# Patient Record
Sex: FEMALE | Race: OTHER | HISPANIC OR LATINO | Employment: FULL TIME | URBAN - METROPOLITAN AREA
[De-identification: names, ages, dates, MRNs, and addresses within clinical notes are randomized per-mention and may not be internally consistent; named-entity substitution may affect disease eponyms.]

---

## 2023-08-29 NOTE — PROGRESS NOTES
"Subjective   Chief Complaint   Patient presents with    New Patient Visit     PAOLA IS HERE TODAY FOR  NPV TO ESTABLISH CARE, PREVIOUSLY SEEN BY TANK LAZO. PT STATES THAT SHE WAS SEEN EVERY 6 MONTHS FOR ROUTINE F/U AND BLOOD WORK.   PT STATES THAT SHE IS DUE FOR BLOOD WORK FOR IRON AND VITAMIN D.     PT ALSO A CONCERN AND WOULD LIKE TO DISCUSS WITH VARICOSE VEINS.      PT IS SCHEDULED TO HAVE SURGERY SOON ON HER JAW       Patient ID: Paola Black is a 28 y.o. female who presents for New Patient Visit (PAOLA IS HERE TODAY FOR  NPV TO ESTABLISH CARE, PREVIOUSLY SEEN BY TANK LAZO. PT STATES THAT SHE WAS SEEN EVERY 6 MONTHS FOR ROUTINE F/U AND BLOOD WORK. /PT STATES THAT SHE IS DUE FOR BLOOD WORK FOR IRON AND VITAMIN D. //PT ALSO A CONCERN AND WOULD LIKE TO DISCUSS WITH VARICOSE VEINS.  //PT IS SCHEDULED TO HAVE SURGERY SOON ON HER JAW).    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     LMP: 5/2023 (DEPO)  PAP: 2022    Pt has been taking Vitamin D 50,000 units daily and Iron 65 mg daily      Review of Systems  All 13 systems were reviewed and are within normal limits except positive and pertinent negative responses which are noted below or in HPI.      Objective   /76   Pulse 80   Ht 1.676 m (5' 6\")   Wt 120 kg (264 lb)   BMI 42.61 kg/m²        Physical Exam    Assessment/Plan       "

## 2023-08-30 ENCOUNTER — LAB (OUTPATIENT)
Dept: LAB | Facility: LAB | Age: 28
End: 2023-08-30
Payer: COMMERCIAL

## 2023-08-30 ENCOUNTER — OFFICE VISIT (OUTPATIENT)
Dept: PRIMARY CARE | Facility: CLINIC | Age: 28
End: 2023-08-30
Payer: COMMERCIAL

## 2023-08-30 VITALS
HEART RATE: 80 BPM | BODY MASS INDEX: 42.43 KG/M2 | WEIGHT: 264 LBS | SYSTOLIC BLOOD PRESSURE: 116 MMHG | DIASTOLIC BLOOD PRESSURE: 76 MMHG | HEIGHT: 66 IN

## 2023-08-30 DIAGNOSIS — K21.9 GASTROESOPHAGEAL REFLUX DISEASE WITHOUT ESOPHAGITIS: ICD-10-CM

## 2023-08-30 DIAGNOSIS — R74.8 ELEVATED LIVER ENZYMES: ICD-10-CM

## 2023-08-30 DIAGNOSIS — Z91.030 BEE STING ALLERGY: ICD-10-CM

## 2023-08-30 DIAGNOSIS — E55.9 VITAMIN D DEFICIENCY: ICD-10-CM

## 2023-08-30 DIAGNOSIS — D50.8 IRON DEFICIENCY ANEMIA SECONDARY TO INADEQUATE DIETARY IRON INTAKE: Primary | ICD-10-CM

## 2023-08-30 DIAGNOSIS — I83.813 VARICOSE VEINS OF BOTH LOWER EXTREMITIES WITH PAIN: ICD-10-CM

## 2023-08-30 DIAGNOSIS — D50.8 IRON DEFICIENCY ANEMIA SECONDARY TO INADEQUATE DIETARY IRON INTAKE: ICD-10-CM

## 2023-08-30 PROBLEM — K58.1 IRRITABLE BOWEL SYNDROME WITH CONSTIPATION: Status: ACTIVE | Noted: 2023-08-30

## 2023-08-30 PROBLEM — R91.1 NODULE OF RIGHT LUNG: Status: ACTIVE | Noted: 2023-08-30

## 2023-08-30 LAB
ALANINE AMINOTRANSFERASE (SGPT) (U/L) IN SER/PLAS: 49 U/L (ref 7–45)
ALBUMIN (G/DL) IN SER/PLAS: 4.1 G/DL (ref 3.4–5)
ALKALINE PHOSPHATASE (U/L) IN SER/PLAS: 101 U/L (ref 33–110)
ANION GAP IN SER/PLAS: 13 MMOL/L (ref 10–20)
ASPARTATE AMINOTRANSFERASE (SGOT) (U/L) IN SER/PLAS: 35 U/L (ref 9–39)
BILIRUBIN TOTAL (MG/DL) IN SER/PLAS: 0.9 MG/DL (ref 0–1.2)
CALCIUM (MG/DL) IN SER/PLAS: 8.9 MG/DL (ref 8.6–10.3)
CARBON DIOXIDE, TOTAL (MMOL/L) IN SER/PLAS: 25 MMOL/L (ref 21–32)
CHLORIDE (MMOL/L) IN SER/PLAS: 106 MMOL/L (ref 98–107)
CREATININE (MG/DL) IN SER/PLAS: 0.66 MG/DL (ref 0.5–1.05)
ERYTHROCYTE DISTRIBUTION WIDTH (RATIO) BY AUTOMATED COUNT: 14.6 % (ref 11.5–14.5)
ERYTHROCYTE MEAN CORPUSCULAR HEMOGLOBIN CONCENTRATION (G/DL) BY AUTOMATED: 31.9 G/DL (ref 32–36)
ERYTHROCYTE MEAN CORPUSCULAR VOLUME (FL) BY AUTOMATED COUNT: 89 FL (ref 80–100)
ERYTHROCYTES (10*6/UL) IN BLOOD BY AUTOMATED COUNT: 4.31 X10E12/L (ref 4–5.2)
FERRITIN (UG/LL) IN SER/PLAS: 48 UG/L (ref 8–150)
GFR FEMALE: >90 ML/MIN/1.73M2
GLUCOSE (MG/DL) IN SER/PLAS: 80 MG/DL (ref 74–99)
HEMATOCRIT (%) IN BLOOD BY AUTOMATED COUNT: 38.2 % (ref 36–46)
HEMOGLOBIN (G/DL) IN BLOOD: 12.2 G/DL (ref 12–16)
IRON (UG/DL) IN SER/PLAS: 48 UG/DL (ref 35–150)
IRON BINDING CAPACITY (UG/DL) IN SER/PLAS: 355 UG/DL (ref 240–445)
IRON SATURATION (%) IN SER/PLAS: 14 % (ref 25–45)
LEUKOCYTES (10*3/UL) IN BLOOD BY AUTOMATED COUNT: 7.5 X10E9/L (ref 4.4–11.3)
PLATELETS (10*3/UL) IN BLOOD AUTOMATED COUNT: 362 X10E9/L (ref 150–450)
POTASSIUM (MMOL/L) IN SER/PLAS: 4.2 MMOL/L (ref 3.5–5.3)
PROTEIN TOTAL: 6.9 G/DL (ref 6.4–8.2)
SODIUM (MMOL/L) IN SER/PLAS: 140 MMOL/L (ref 136–145)
UREA NITROGEN (MG/DL) IN SER/PLAS: 10 MG/DL (ref 6–23)

## 2023-08-30 PROCEDURE — 82306 VITAMIN D 25 HYDROXY: CPT

## 2023-08-30 PROCEDURE — 80053 COMPREHEN METABOLIC PANEL: CPT

## 2023-08-30 PROCEDURE — 82728 ASSAY OF FERRITIN: CPT

## 2023-08-30 PROCEDURE — 1036F TOBACCO NON-USER: CPT | Performed by: NURSE PRACTITIONER

## 2023-08-30 PROCEDURE — 36415 COLL VENOUS BLD VENIPUNCTURE: CPT

## 2023-08-30 PROCEDURE — 85027 COMPLETE CBC AUTOMATED: CPT

## 2023-08-30 PROCEDURE — 83540 ASSAY OF IRON: CPT

## 2023-08-30 PROCEDURE — 99214 OFFICE O/P EST MOD 30 MIN: CPT | Performed by: NURSE PRACTITIONER

## 2023-08-30 PROCEDURE — 3008F BODY MASS INDEX DOCD: CPT | Performed by: NURSE PRACTITIONER

## 2023-08-30 PROCEDURE — 83550 IRON BINDING TEST: CPT

## 2023-08-30 RX ORDER — MEDROXYPROGESTERONE ACETATE 150 MG/ML
INJECTION, SUSPENSION INTRAMUSCULAR
COMMUNITY
Start: 2023-07-21

## 2023-08-30 RX ORDER — EPINEPHRINE 0.3 MG/.3ML
INJECTION SUBCUTANEOUS
Qty: 0.3 ML | Refills: 0 | Status: SHIPPED | OUTPATIENT
Start: 2023-08-30

## 2023-08-30 RX ORDER — AMITRIPTYLINE HYDROCHLORIDE 10 MG/1
10 TABLET, FILM COATED ORAL NIGHTLY
COMMUNITY

## 2023-08-30 RX ORDER — EPINEPHRINE 0.3 MG/.3ML
INJECTION SUBCUTANEOUS
COMMUNITY
Start: 2020-02-25 | End: 2023-08-30 | Stop reason: SDUPTHER

## 2023-08-30 RX ORDER — OMEPRAZOLE 20 MG/1
20 TABLET, DELAYED RELEASE ORAL DAILY
Qty: 30 TABLET | Refills: 11 | Status: SHIPPED | COMMUNITY
Start: 2023-08-30 | End: 2024-02-15 | Stop reason: SDUPTHER

## 2023-08-30 RX ORDER — ERGOCALCIFEROL 1.25 MG/1
1 CAPSULE ORAL
COMMUNITY

## 2023-08-30 RX ORDER — LINACLOTIDE 72 UG/1
72 CAPSULE, GELATIN COATED ORAL DAILY
COMMUNITY
End: 2024-01-26

## 2023-08-30 RX ORDER — FERROUS SULFATE 325(65) MG
1 TABLET ORAL DAILY
COMMUNITY
End: 2024-05-13

## 2023-08-30 NOTE — PATIENT INSTRUCTIONS
Thank you for seeing me today.  It was a pleasure to meet you!    #BEE STING ALLERGY  Refill on Epi pen    #Hx ELEVATED LIVER ENZYMES  Labs ordered    #VIT D DEF  Labs ordered    #HERI  Labs ordered    RTC ANNUALLY AND AS NEEDED

## 2023-08-30 NOTE — PROGRESS NOTES
"Subjective   Chief Complaint   Patient presents with    New Patient Visit     PAOLA IS HERE TODAY FOR  NPV TO ESTABLISH CARE, PREVIOUSLY SEEN BY TANK LAZO. PT STATES THAT SHE WAS SEEN EVERY 6 MONTHS FOR ROUTINE F/U AND BLOOD WORK.   PT STATES THAT SHE IS DUE FOR BLOOD WORK FOR IRON AND VITAMIN D.     PT ALSO A CONCERN AND WOULD LIKE TO DISCUSS WITH VARICOSE VEINS.      PT IS SCHEDULED TO HAVE SURGERY SOON ON HER JAW       Patient ID: Paola Black is a 28 y.o. female who presents for New Patient Visit (PAOLA IS HERE TODAY FOR  NPV TO ESTABLISH CARE, PREVIOUSLY SEEN BY TANK LAZO. PT STATES THAT SHE WAS SEEN EVERY 6 MONTHS FOR ROUTINE F/U AND BLOOD WORK. /PT STATES THAT SHE IS DUE FOR BLOOD WORK FOR IRON AND VITAMIN D. //PT ALSO A CONCERN AND WOULD LIKE TO DISCUSS WITH VARICOSE VEINS.  //PT IS SCHEDULED TO HAVE SURGERY SOON ON HER JAW).    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     LMP: 5/2023 (DEPO)  PAP: 2022    Review of Systems  All 13 systems were reviewed and are within normal limits except positive and pertinent negative responses which are noted below or in HPI.      Objective   /76   Pulse 80   Ht 1.676 m (5' 6\")   Wt 120 kg (264 lb)   BMI 42.61 kg/m²        Physical Exam  Vitals reviewed.   Cardiovascular:      Pulses: Normal pulses.   Pulmonary:      Effort: Pulmonary effort is normal.   Neurological:      Mental Status: She is alert.   Psychiatric:         Mood and Affect: Mood normal. "         Assessment/Plan   Problem List Items Addressed This Visit       Vitamin D deficiency    Relevant Orders    Vitamin D 25-Hydroxy,Total (for eval of Vitamin D levels)     Other Visit Diagnoses       Iron deficiency anemia secondary to inadequate dietary iron intake    -  Primary    Relevant Orders    CBC    Iron and TIBC    Ferritin    Bee sting allergy        Relevant Medications    EPINEPHrine 0.3 mg/0.3 mL injection syringe    Elevated liver enzymes        Relevant Orders    Comprehensive Metabolic Panel    Gastroesophageal reflux disease without esophagitis        Relevant Medications    omeprazole OTC (PriLOSEC OTC) 20 mg EC tablet    Varicose veins of both lower extremities with pain        Relevant Orders    Referral to Vascular Medicine

## 2023-08-31 LAB — CALCIDIOL (25 OH VITAMIN D3) (NG/ML) IN SER/PLAS: 38 NG/ML

## 2023-12-15 ENCOUNTER — PREP FOR PROCEDURE (OUTPATIENT)
Dept: OPERATING ROOM | Facility: HOSPITAL | Age: 28
End: 2023-12-15
Payer: COMMERCIAL

## 2023-12-15 DIAGNOSIS — M26.69: Primary | ICD-10-CM

## 2023-12-18 ENCOUNTER — HOSPITAL ENCOUNTER (OUTPATIENT)
Facility: HOSPITAL | Age: 28
Setting detail: SURGERY ADMIT
End: 2023-12-18
Attending: DENTIST | Admitting: DENTIST
Payer: COMMERCIAL

## 2023-12-18 PROBLEM — M26.69 DERANGEMENT OF TEMPOROMANDIBULAR JOINT: Status: ACTIVE | Noted: 2023-12-15

## 2024-01-25 DIAGNOSIS — K59.04 CHRONIC IDIOPATHIC CONSTIPATION: ICD-10-CM

## 2024-01-26 NOTE — TELEPHONE ENCOUNTER
Requested Prescriptions     Pending Prescriptions Disp Refills    Linzess 72 mcg capsule [Pharmacy Med Name: LINZESS 72 MCG CAPSULE] 30 capsule 2     Sig: TAKE 1 CAPSULE BY MOUTH EVERY DAY

## 2024-02-06 ENCOUNTER — HOSPITAL ENCOUNTER (OUTPATIENT)
Dept: RADIOLOGY | Facility: HOSPITAL | Age: 29
End: 2024-02-06
Payer: COMMERCIAL

## 2024-02-08 ENCOUNTER — HOSPITAL ENCOUNTER (OUTPATIENT)
Dept: RADIOLOGY | Facility: HOSPITAL | Age: 29
Discharge: HOME | End: 2024-02-08
Payer: COMMERCIAL

## 2024-02-08 DIAGNOSIS — N91.2 AMENORRHEA: ICD-10-CM

## 2024-02-08 PROCEDURE — 76830 TRANSVAGINAL US NON-OB: CPT | Performed by: RADIOLOGY

## 2024-02-08 PROCEDURE — 76856 US EXAM PELVIC COMPLETE: CPT

## 2024-02-08 PROCEDURE — 76856 US EXAM PELVIC COMPLETE: CPT | Performed by: RADIOLOGY

## 2024-02-15 DIAGNOSIS — K21.9 GASTROESOPHAGEAL REFLUX DISEASE WITHOUT ESOPHAGITIS: ICD-10-CM

## 2024-02-15 RX ORDER — OMEPRAZOLE 20 MG/1
20 TABLET, DELAYED RELEASE ORAL DAILY
Qty: 90 TABLET | Refills: 3 | Status: SHIPPED | OUTPATIENT
Start: 2024-02-15 | End: 2024-03-18

## 2024-02-16 ENCOUNTER — LAB (OUTPATIENT)
Dept: LAB | Facility: LAB | Age: 29
End: 2024-02-16
Payer: COMMERCIAL

## 2024-02-16 DIAGNOSIS — N91.2 AMENORRHEA, UNSPECIFIED: Primary | ICD-10-CM

## 2024-02-16 LAB
DHEA-S SERPL-MCNC: 54 UG/DL (ref 65–395)
ESTRADIOL SERPL-MCNC: 27 PG/ML
FSH SERPL-ACNC: 5.8 IU/L
LH SERPL-ACNC: 3.7 IU/L
PROGEST SERPL-MCNC: <0.3 NG/ML
PROLACTIN SERPL-MCNC: 9.8 UG/L (ref 3–20)
TSH SERPL-ACNC: 1.06 MIU/L (ref 0.44–3.98)

## 2024-02-16 PROCEDURE — 84402 ASSAY OF FREE TESTOSTERONE: CPT

## 2024-02-16 PROCEDURE — 84144 ASSAY OF PROGESTERONE: CPT

## 2024-02-16 PROCEDURE — 84443 ASSAY THYROID STIM HORMONE: CPT

## 2024-02-16 PROCEDURE — 82627 DEHYDROEPIANDROSTERONE: CPT

## 2024-02-16 PROCEDURE — 36415 COLL VENOUS BLD VENIPUNCTURE: CPT

## 2024-02-16 PROCEDURE — 82670 ASSAY OF TOTAL ESTRADIOL: CPT

## 2024-02-16 PROCEDURE — 84146 ASSAY OF PROLACTIN: CPT

## 2024-02-16 PROCEDURE — 83001 ASSAY OF GONADOTROPIN (FSH): CPT

## 2024-02-16 PROCEDURE — 83002 ASSAY OF GONADOTROPIN (LH): CPT

## 2024-02-21 LAB
TESTOSTERONE FREE (CHAN): 2.7 PG/ML (ref 0.1–6.4)
TESTOSTERONE,TOTAL,LC-MS/MS: 15 NG/DL (ref 2–45)

## 2024-03-18 DIAGNOSIS — K21.9 GASTROESOPHAGEAL REFLUX DISEASE WITHOUT ESOPHAGITIS: ICD-10-CM

## 2024-03-18 RX ORDER — OMEPRAZOLE 20 MG/1
TABLET, DELAYED RELEASE ORAL
Qty: 90 TABLET | Refills: 3 | Status: SHIPPED | OUTPATIENT
Start: 2024-03-18

## 2024-03-27 RX ORDER — BISMUTH SUBSALICYLATE 262 MG
1 TABLET,CHEWABLE ORAL DAILY
COMMUNITY

## 2024-03-28 ENCOUNTER — HOSPITAL ENCOUNTER (OUTPATIENT)
Facility: HOSPITAL | Age: 29
LOS: 1 days | Discharge: HOME | End: 2024-03-29
Attending: DENTIST | Admitting: DENTIST
Payer: COMMERCIAL

## 2024-03-28 ENCOUNTER — ANESTHESIA (OUTPATIENT)
Dept: OPERATING ROOM | Facility: HOSPITAL | Age: 29
End: 2024-03-28
Payer: COMMERCIAL

## 2024-03-28 ENCOUNTER — ANESTHESIA EVENT (OUTPATIENT)
Dept: OPERATING ROOM | Facility: HOSPITAL | Age: 29
End: 2024-03-28
Payer: COMMERCIAL

## 2024-03-28 DIAGNOSIS — M26.623 ARTHRALGIA OF BILATERAL TEMPOROMANDIBULAR JOINT: ICD-10-CM

## 2024-03-28 DIAGNOSIS — M26.631 ARTICULAR DISC DISORDER OF RIGHT TEMPOROMANDIBULAR JOINT: Primary | ICD-10-CM

## 2024-03-28 DIAGNOSIS — G89.18 POST-OP PAIN: ICD-10-CM

## 2024-03-28 DIAGNOSIS — R11.0 NAUSEA: ICD-10-CM

## 2024-03-28 DIAGNOSIS — Z91.89 AT RISK FOR INFECTION: ICD-10-CM

## 2024-03-28 LAB — PREGNANCY TEST URINE, POC: NEGATIVE

## 2024-03-28 PROCEDURE — 2500000005 HC RX 250 GENERAL PHARMACY W/O HCPCS: Performed by: DENTIST

## 2024-03-28 PROCEDURE — 2720000007 HC OR 272 NO HCPCS: Performed by: DENTIST

## 2024-03-28 PROCEDURE — 3700000002 HC GENERAL ANESTHESIA TIME - EACH INCREMENTAL 1 MINUTE: Performed by: DENTIST

## 2024-03-28 PROCEDURE — 3600000004 HC OR TIME - INITIAL BASE CHARGE - PROCEDURE LEVEL FOUR: Performed by: DENTIST

## 2024-03-28 PROCEDURE — 3700000001 HC GENERAL ANESTHESIA TIME - INITIAL BASE CHARGE: Performed by: DENTIST

## 2024-03-28 PROCEDURE — 88304 TISSUE EXAM BY PATHOLOGIST: CPT | Performed by: PATHOLOGY

## 2024-03-28 PROCEDURE — 7100000001 HC RECOVERY ROOM TIME - INITIAL BASE CHARGE: Performed by: DENTIST

## 2024-03-28 PROCEDURE — 96372 THER/PROPH/DIAG INJ SC/IM: CPT | Performed by: DENTIST

## 2024-03-28 PROCEDURE — A21243 PR ARTHROPLASTY TMJ+PROSTHESIS: Performed by: STUDENT IN AN ORGANIZED HEALTH CARE EDUCATION/TRAINING PROGRAM

## 2024-03-28 PROCEDURE — 2500000005 HC RX 250 GENERAL PHARMACY W/O HCPCS: Performed by: ANESTHESIOLOGIST ASSISTANT

## 2024-03-28 PROCEDURE — 2500000004 HC RX 250 GENERAL PHARMACY W/ HCPCS (ALT 636 FOR OP/ED): Performed by: ANESTHESIOLOGIST ASSISTANT

## 2024-03-28 PROCEDURE — 2500000001 HC RX 250 WO HCPCS SELF ADMINISTERED DRUGS (ALT 637 FOR MEDICARE OP): Performed by: DENTIST

## 2024-03-28 PROCEDURE — 2500000004 HC RX 250 GENERAL PHARMACY W/ HCPCS (ALT 636 FOR OP/ED): Performed by: DENTIST

## 2024-03-28 PROCEDURE — 3600000009 HC OR TIME - EACH INCREMENTAL 1 MINUTE - PROCEDURE LEVEL FOUR: Performed by: DENTIST

## 2024-03-28 PROCEDURE — 1100000001 HC PRIVATE ROOM DAILY

## 2024-03-28 PROCEDURE — A21243 PR ARTHROPLASTY TMJ+PROSTHESIS: Performed by: ANESTHESIOLOGIST ASSISTANT

## 2024-03-28 PROCEDURE — 2500000001 HC RX 250 WO HCPCS SELF ADMINISTERED DRUGS (ALT 637 FOR MEDICARE OP): Performed by: ANESTHESIOLOGIST ASSISTANT

## 2024-03-28 PROCEDURE — 7100000002 HC RECOVERY ROOM TIME - EACH INCREMENTAL 1 MINUTE: Performed by: DENTIST

## 2024-03-28 PROCEDURE — A4217 STERILE WATER/SALINE, 500 ML: HCPCS | Performed by: DENTIST

## 2024-03-28 PROCEDURE — 2500000004 HC RX 250 GENERAL PHARMACY W/ HCPCS (ALT 636 FOR OP/ED): Performed by: STUDENT IN AN ORGANIZED HEALTH CARE EDUCATION/TRAINING PROGRAM

## 2024-03-28 PROCEDURE — C1889 IMPLANT/INSERT DEVICE, NOC: HCPCS | Performed by: DENTIST

## 2024-03-28 PROCEDURE — 88304 TISSUE EXAM BY PATHOLOGIST: CPT | Mod: TC,AHULAB,WESLAB | Performed by: DENTIST

## 2024-03-28 PROCEDURE — C1713 ANCHOR/SCREW BN/BN,TIS/BN: HCPCS | Performed by: DENTIST

## 2024-03-28 PROCEDURE — 2780000003 HC OR 278 NO HCPCS: Performed by: DENTIST

## 2024-03-28 PROCEDURE — 88311 DECALCIFY TISSUE: CPT | Performed by: PATHOLOGY

## 2024-03-28 DEVICE — IMPLANTABLE DEVICE
Type: IMPLANTABLE DEVICE | Site: MANDIBLE | Status: FUNCTIONAL
Brand: TRAUMAONE SYSTEM

## 2024-03-28 DEVICE — IMPLANTABLE DEVICE: Type: IMPLANTABLE DEVICE | Site: MANDIBLE | Status: FUNCTIONAL

## 2024-03-28 DEVICE — 2.0X9MM FOSSA CROSS-DRIVE SCREW
Type: IMPLANTABLE DEVICE | Site: MANDIBLE | Status: FUNCTIONAL
Brand: TMJ SYSTEM

## 2024-03-28 DEVICE — 2.7X8MM HIGH TORQUE CROSS-DRIVE SCREW
Type: IMPLANTABLE DEVICE | Site: MANDIBLE | Status: FUNCTIONAL
Brand: 2.4MM SYSTEM

## 2024-03-28 DEVICE — 50 MM LEFT NARROW TITANIUM MANDIBULAR COMPONENT
Type: IMPLANTABLE DEVICE | Site: MANDIBLE | Status: FUNCTIONAL
Brand: TMJ SYSTEM

## 2024-03-28 RX ORDER — SODIUM CHLORIDE, SODIUM LACTATE, POTASSIUM CHLORIDE, CALCIUM CHLORIDE 600; 310; 30; 20 MG/100ML; MG/100ML; MG/100ML; MG/100ML
100 INJECTION, SOLUTION INTRAVENOUS CONTINUOUS
Status: DISCONTINUED | OUTPATIENT
Start: 2024-03-28 | End: 2024-03-29 | Stop reason: HOSPADM

## 2024-03-28 RX ORDER — AMITRIPTYLINE HYDROCHLORIDE 10 MG/1
10 TABLET, FILM COATED ORAL NIGHTLY
Status: DISCONTINUED | OUTPATIENT
Start: 2024-03-28 | End: 2024-03-28

## 2024-03-28 RX ORDER — NALOXONE HYDROCHLORIDE 1 MG/ML
0.2 INJECTION INTRAMUSCULAR; INTRAVENOUS; SUBCUTANEOUS EVERY 5 MIN PRN
Status: DISCONTINUED | OUTPATIENT
Start: 2024-03-28 | End: 2024-03-29 | Stop reason: HOSPADM

## 2024-03-28 RX ORDER — LIDOCAINE HYDROCHLORIDE 20 MG/ML
INJECTION, SOLUTION INFILTRATION; PERINEURAL AS NEEDED
Status: DISCONTINUED | OUTPATIENT
Start: 2024-03-28 | End: 2024-03-28

## 2024-03-28 RX ORDER — SUCCINYLCHOLINE CHLORIDE 20 MG/ML
INJECTION INTRAMUSCULAR; INTRAVENOUS AS NEEDED
Status: DISCONTINUED | OUTPATIENT
Start: 2024-03-28 | End: 2024-03-28

## 2024-03-28 RX ORDER — TRANEXAMIC ACID 100 MG/ML
INJECTION, SOLUTION INTRAVENOUS AS NEEDED
Status: DISCONTINUED | OUTPATIENT
Start: 2024-03-28 | End: 2024-03-28

## 2024-03-28 RX ORDER — ONDANSETRON 4 MG/1
4 TABLET, FILM COATED ORAL EVERY 8 HOURS PRN
Status: DISCONTINUED | OUTPATIENT
Start: 2024-03-28 | End: 2024-03-29 | Stop reason: HOSPADM

## 2024-03-28 RX ORDER — ONDANSETRON HYDROCHLORIDE 2 MG/ML
4 INJECTION, SOLUTION INTRAVENOUS ONCE AS NEEDED
Status: COMPLETED | OUTPATIENT
Start: 2024-03-28 | End: 2024-03-28

## 2024-03-28 RX ORDER — NALOXONE HYDROCHLORIDE 0.4 MG/ML
0.2 INJECTION, SOLUTION INTRAMUSCULAR; INTRAVENOUS; SUBCUTANEOUS EVERY 5 MIN PRN
Status: DISCONTINUED | OUTPATIENT
Start: 2024-03-28 | End: 2024-03-29 | Stop reason: HOSPADM

## 2024-03-28 RX ORDER — GLYCOPYRROLATE 0.2 MG/ML
INJECTION INTRAMUSCULAR; INTRAVENOUS AS NEEDED
Status: DISCONTINUED | OUTPATIENT
Start: 2024-03-28 | End: 2024-03-28

## 2024-03-28 RX ORDER — SODIUM CHLORIDE, SODIUM LACTATE, POTASSIUM CHLORIDE, CALCIUM CHLORIDE 600; 310; 30; 20 MG/100ML; MG/100ML; MG/100ML; MG/100ML
100 INJECTION, SOLUTION INTRAVENOUS CONTINUOUS
Status: DISCONTINUED | OUTPATIENT
Start: 2024-03-28 | End: 2024-03-28 | Stop reason: HOSPADM

## 2024-03-28 RX ORDER — BACITRACIN ZINC 500 UNIT/G
OINTMENT (GRAM) TOPICAL 3 TIMES DAILY
Status: DISCONTINUED | OUTPATIENT
Start: 2024-03-28 | End: 2024-03-29 | Stop reason: HOSPADM

## 2024-03-28 RX ORDER — DIPHENHYDRAMINE HYDROCHLORIDE 50 MG/ML
12.5 INJECTION INTRAMUSCULAR; INTRAVENOUS ONCE AS NEEDED
Status: DISCONTINUED | OUTPATIENT
Start: 2024-03-28 | End: 2024-03-28 | Stop reason: HOSPADM

## 2024-03-28 RX ORDER — OXYCODONE HYDROCHLORIDE 5 MG/1
5 TABLET ORAL EVERY 4 HOURS PRN
Status: DISCONTINUED | OUTPATIENT
Start: 2024-03-28 | End: 2024-03-28 | Stop reason: HOSPADM

## 2024-03-28 RX ORDER — BUPIVACAINE HYDROCHLORIDE 5 MG/ML
INJECTION, SOLUTION PERINEURAL AS NEEDED
Status: DISCONTINUED | OUTPATIENT
Start: 2024-03-28 | End: 2024-03-28 | Stop reason: HOSPADM

## 2024-03-28 RX ORDER — ESMOLOL HYDROCHLORIDE 10 MG/ML
INJECTION INTRAVENOUS AS NEEDED
Status: DISCONTINUED | OUTPATIENT
Start: 2024-03-28 | End: 2024-03-28

## 2024-03-28 RX ORDER — FERROUS SULFATE 325(65) MG
1 TABLET ORAL DAILY
Status: DISCONTINUED | OUTPATIENT
Start: 2024-03-29 | End: 2024-03-29 | Stop reason: HOSPADM

## 2024-03-28 RX ORDER — OXYCODONE HYDROCHLORIDE 5 MG/1
5 TABLET ORAL EVERY 6 HOURS PRN
Status: DISCONTINUED | OUTPATIENT
Start: 2024-03-28 | End: 2024-03-29 | Stop reason: HOSPADM

## 2024-03-28 RX ORDER — POLYETHYLENE GLYCOL 3350 17 G/17G
17 POWDER, FOR SOLUTION ORAL DAILY PRN
Status: DISCONTINUED | OUTPATIENT
Start: 2024-03-28 | End: 2024-03-29 | Stop reason: HOSPADM

## 2024-03-28 RX ORDER — FENTANYL CITRATE 50 UG/ML
INJECTION, SOLUTION INTRAMUSCULAR; INTRAVENOUS AS NEEDED
Status: DISCONTINUED | OUTPATIENT
Start: 2024-03-28 | End: 2024-03-28

## 2024-03-28 RX ORDER — PHENYLEPHRINE HCL IN 0.9% NACL 1 MG/10 ML
SYRINGE (ML) INTRAVENOUS AS NEEDED
Status: DISCONTINUED | OUTPATIENT
Start: 2024-03-28 | End: 2024-03-28

## 2024-03-28 RX ORDER — REMIFENTANIL HYDROCHLORIDE 1 MG/ML
INJECTION, POWDER, LYOPHILIZED, FOR SOLUTION INTRAVENOUS CONTINUOUS PRN
Status: DISCONTINUED | OUTPATIENT
Start: 2024-03-28 | End: 2024-03-28

## 2024-03-28 RX ORDER — CYCLOBENZAPRINE HCL 10 MG
10 TABLET ORAL NIGHTLY
Status: DISCONTINUED | OUTPATIENT
Start: 2024-03-28 | End: 2024-03-29 | Stop reason: HOSPADM

## 2024-03-28 RX ORDER — DOCUSATE SODIUM 100 MG/1
100 CAPSULE, LIQUID FILLED ORAL 2 TIMES DAILY PRN
Status: DISCONTINUED | OUTPATIENT
Start: 2024-03-28 | End: 2024-03-29 | Stop reason: HOSPADM

## 2024-03-28 RX ORDER — SODIUM CHLORIDE, SODIUM LACTATE, POTASSIUM CHLORIDE, CALCIUM CHLORIDE 600; 310; 30; 20 MG/100ML; MG/100ML; MG/100ML; MG/100ML
100 INJECTION, SOLUTION INTRAVENOUS CONTINUOUS
Status: DISCONTINUED | OUTPATIENT
Start: 2024-03-28 | End: 2024-03-28

## 2024-03-28 RX ORDER — SODIUM CHLORIDE 0.9 G/100ML
IRRIGANT IRRIGATION AS NEEDED
Status: DISCONTINUED | OUTPATIENT
Start: 2024-03-28 | End: 2024-03-28 | Stop reason: HOSPADM

## 2024-03-28 RX ORDER — ONDANSETRON HYDROCHLORIDE 2 MG/ML
INJECTION, SOLUTION INTRAVENOUS AS NEEDED
Status: DISCONTINUED | OUTPATIENT
Start: 2024-03-28 | End: 2024-03-28

## 2024-03-28 RX ORDER — ENOXAPARIN SODIUM 100 MG/ML
40 INJECTION SUBCUTANEOUS EVERY 12 HOURS SCHEDULED
Status: DISCONTINUED | OUTPATIENT
Start: 2024-03-28 | End: 2024-03-29 | Stop reason: HOSPADM

## 2024-03-28 RX ORDER — CEFAZOLIN 1 G/1
INJECTION, POWDER, FOR SOLUTION INTRAVENOUS AS NEEDED
Status: DISCONTINUED | OUTPATIENT
Start: 2024-03-28 | End: 2024-03-28

## 2024-03-28 RX ORDER — LABETALOL HYDROCHLORIDE 5 MG/ML
5 INJECTION, SOLUTION INTRAVENOUS ONCE AS NEEDED
Status: DISCONTINUED | OUTPATIENT
Start: 2024-03-28 | End: 2024-03-28 | Stop reason: HOSPADM

## 2024-03-28 RX ORDER — MIDAZOLAM HYDROCHLORIDE 1 MG/ML
INJECTION INTRAMUSCULAR; INTRAVENOUS AS NEEDED
Status: DISCONTINUED | OUTPATIENT
Start: 2024-03-28 | End: 2024-03-28

## 2024-03-28 RX ORDER — PROPOFOL 10 MG/ML
INJECTION, EMULSION INTRAVENOUS AS NEEDED
Status: DISCONTINUED | OUTPATIENT
Start: 2024-03-28 | End: 2024-03-28

## 2024-03-28 RX ORDER — LIDOCAINE HYDROCHLORIDE 40 MG/ML
SOLUTION TOPICAL AS NEEDED
Status: DISCONTINUED | OUTPATIENT
Start: 2024-03-28 | End: 2024-03-28

## 2024-03-28 RX ORDER — HYDROMORPHONE HYDROCHLORIDE 1 MG/ML
INJECTION, SOLUTION INTRAMUSCULAR; INTRAVENOUS; SUBCUTANEOUS AS NEEDED
Status: DISCONTINUED | OUTPATIENT
Start: 2024-03-28 | End: 2024-03-28

## 2024-03-28 RX ORDER — PANTOPRAZOLE SODIUM 20 MG/1
20 TABLET, DELAYED RELEASE ORAL
Status: DISCONTINUED | OUTPATIENT
Start: 2024-03-29 | End: 2024-03-29 | Stop reason: HOSPADM

## 2024-03-28 RX ORDER — CHLORHEXIDINE GLUCONATE ORAL RINSE 1.2 MG/ML
15 SOLUTION DENTAL 3 TIMES DAILY
Status: DISCONTINUED | OUTPATIENT
Start: 2024-03-28 | End: 2024-03-29 | Stop reason: HOSPADM

## 2024-03-28 RX ORDER — OXYCODONE HYDROCHLORIDE 5 MG/1
10 TABLET ORAL EVERY 4 HOURS PRN
Status: DISCONTINUED | OUTPATIENT
Start: 2024-03-28 | End: 2024-03-29 | Stop reason: HOSPADM

## 2024-03-28 RX ORDER — ERGOCALCIFEROL 1.25 MG/1
1250 CAPSULE ORAL
Status: DISCONTINUED | OUTPATIENT
Start: 2024-03-29 | End: 2024-03-28

## 2024-03-28 RX ORDER — IBUPROFEN 600 MG/1
600 TABLET ORAL 3 TIMES DAILY
Status: DISCONTINUED | OUTPATIENT
Start: 2024-03-28 | End: 2024-03-29 | Stop reason: HOSPADM

## 2024-03-28 RX ORDER — LIDOCAINE HYDROCHLORIDE 10 MG/ML
0.1 INJECTION, SOLUTION EPIDURAL; INFILTRATION; INTRACAUDAL; PERINEURAL ONCE
Status: DISCONTINUED | OUTPATIENT
Start: 2024-03-28 | End: 2024-03-28 | Stop reason: HOSPADM

## 2024-03-28 RX ORDER — TRIPROLIDINE/PSEUDOEPHEDRINE 2.5MG-60MG
600 TABLET ORAL 3 TIMES DAILY
Status: DISCONTINUED | OUTPATIENT
Start: 2024-03-28 | End: 2024-03-29 | Stop reason: HOSPADM

## 2024-03-28 RX ORDER — ONDANSETRON HYDROCHLORIDE 2 MG/ML
4 INJECTION, SOLUTION INTRAVENOUS EVERY 8 HOURS PRN
Status: DISCONTINUED | OUTPATIENT
Start: 2024-03-28 | End: 2024-03-29 | Stop reason: HOSPADM

## 2024-03-28 RX ORDER — ROCURONIUM BROMIDE 10 MG/ML
INJECTION, SOLUTION INTRAVENOUS AS NEEDED
Status: DISCONTINUED | OUTPATIENT
Start: 2024-03-28 | End: 2024-03-28

## 2024-03-28 RX ADMIN — ESMOLOL HYDROCHLORIDE 20 MG: 10 INJECTION, SOLUTION INTRAVENOUS at 16:12

## 2024-03-28 RX ADMIN — AMPICILLIN SODIUM AND SULBACTAM SODIUM 3 G: 2; 1 INJECTION, POWDER, FOR SOLUTION INTRAMUSCULAR; INTRAVENOUS at 18:37

## 2024-03-28 RX ADMIN — ESMOLOL HYDROCHLORIDE 20 MG: 10 INJECTION, SOLUTION INTRAVENOUS at 15:06

## 2024-03-28 RX ADMIN — CEFAZOLIN 3 G: 1 INJECTION, POWDER, FOR SOLUTION INTRAMUSCULAR; INTRAVENOUS at 13:00

## 2024-03-28 RX ADMIN — ESMOLOL HYDROCHLORIDE 20 MG: 10 INJECTION, SOLUTION INTRAVENOUS at 16:54

## 2024-03-28 RX ADMIN — HYDROMORPHONE HYDROCHLORIDE 0.2 MG: 1 INJECTION, SOLUTION INTRAMUSCULAR; INTRAVENOUS; SUBCUTANEOUS at 17:27

## 2024-03-28 RX ADMIN — ROCURONIUM BROMIDE 30 MG: 10 INJECTION, SOLUTION INTRAVENOUS at 16:03

## 2024-03-28 RX ADMIN — HYDROMORPHONE HYDROCHLORIDE 0.4 MG: 1 INJECTION, SOLUTION INTRAMUSCULAR; INTRAVENOUS; SUBCUTANEOUS at 13:46

## 2024-03-28 RX ADMIN — Medication 100 MCG: at 13:15

## 2024-03-28 RX ADMIN — HYDROMORPHONE HYDROCHLORIDE 0.2 MG: 1 INJECTION, SOLUTION INTRAMUSCULAR; INTRAVENOUS; SUBCUTANEOUS at 14:10

## 2024-03-28 RX ADMIN — MIDAZOLAM HYDROCHLORIDE 2 MG: 1 INJECTION, SOLUTION INTRAMUSCULAR; INTRAVENOUS at 12:30

## 2024-03-28 RX ADMIN — CHLORHEXIDINE GLUCONATE 0.12% ORAL RINSE 15 ML: 1.2 LIQUID ORAL at 22:03

## 2024-03-28 RX ADMIN — HYDROMORPHONE HYDROCHLORIDE 0.6 MG: 1 INJECTION, SOLUTION INTRAMUSCULAR; INTRAVENOUS; SUBCUTANEOUS at 17:58

## 2024-03-28 RX ADMIN — HYDROMORPHONE HYDROCHLORIDE 0.2 MG: 1 INJECTION, SOLUTION INTRAMUSCULAR; INTRAVENOUS; SUBCUTANEOUS at 17:13

## 2024-03-28 RX ADMIN — PROPOFOL 200 MG: 10 INJECTION, EMULSION INTRAVENOUS at 12:33

## 2024-03-28 RX ADMIN — LIDOCAINE HYDROCHLORIDE 100 MG: 20 INJECTION, SOLUTION INFILTRATION; PERINEURAL at 12:33

## 2024-03-28 RX ADMIN — FENTANYL CITRATE 100 MCG: 50 INJECTION, SOLUTION INTRAMUSCULAR; INTRAVENOUS at 12:33

## 2024-03-28 RX ADMIN — DEXAMETHASONE SODIUM PHOSPHATE 8 MG: 4 INJECTION, SOLUTION INTRAMUSCULAR; INTRAVENOUS at 22:02

## 2024-03-28 RX ADMIN — SUGAMMADEX 200 MG: 100 INJECTION, SOLUTION INTRAVENOUS at 17:40

## 2024-03-28 RX ADMIN — ONDANSETRON 4 MG: 2 INJECTION INTRAMUSCULAR; INTRAVENOUS at 22:43

## 2024-03-28 RX ADMIN — HYDROMORPHONE HYDROCHLORIDE 0.5 MG: 1 INJECTION, SOLUTION INTRAMUSCULAR; INTRAVENOUS; SUBCUTANEOUS at 19:18

## 2024-03-28 RX ADMIN — ONDANSETRON 4 MG: 2 INJECTION INTRAMUSCULAR; INTRAVENOUS at 16:41

## 2024-03-28 RX ADMIN — GLYCOPYRROLATE 0.2 MG: 0.2 INJECTION, SOLUTION INTRAMUSCULAR; INTRAVENOUS at 12:30

## 2024-03-28 RX ADMIN — SUCCINYLCHOLINE CHLORIDE 140 MG: 20 INJECTION, SOLUTION INTRAMUSCULAR; INTRAVENOUS at 12:33

## 2024-03-28 RX ADMIN — CYCLOBENZAPRINE 10 MG: 10 TABLET, FILM COATED ORAL at 22:03

## 2024-03-28 RX ADMIN — CEFAZOLIN 3 G: 1 INJECTION, POWDER, FOR SOLUTION INTRAMUSCULAR; INTRAVENOUS at 17:00

## 2024-03-28 RX ADMIN — ENOXAPARIN SODIUM 40 MG: 40 INJECTION SUBCUTANEOUS at 22:02

## 2024-03-28 RX ADMIN — DEXAMETHASONE SODIUM PHOSPHATE 10 MG: 4 INJECTION, SOLUTION INTRAMUSCULAR; INTRAVENOUS at 12:50

## 2024-03-28 RX ADMIN — SODIUM CHLORIDE, POTASSIUM CHLORIDE, SODIUM LACTATE AND CALCIUM CHLORIDE 100 ML/HR: 600; 310; 30; 20 INJECTION, SOLUTION INTRAVENOUS at 18:30

## 2024-03-28 RX ADMIN — OXYCODONE HYDROCHLORIDE 10 MG: 5 TABLET ORAL at 22:03

## 2024-03-28 RX ADMIN — Medication 150 MCG: at 16:15

## 2024-03-28 RX ADMIN — Medication 0.1 MCG/KG/MIN: at 12:37

## 2024-03-28 RX ADMIN — ESMOLOL HYDROCHLORIDE 20 MG: 10 INJECTION, SOLUTION INTRAVENOUS at 17:13

## 2024-03-28 RX ADMIN — TRANEXAMIC ACID 1000 MG: 1 INJECTION, SOLUTION INTRAVENOUS at 17:38

## 2024-03-28 RX ADMIN — ONDANSETRON 4 MG: 2 INJECTION INTRAMUSCULAR; INTRAVENOUS at 19:18

## 2024-03-28 RX ADMIN — BACITRACIN ZINC: 500 OINTMENT TOPICAL at 22:02

## 2024-03-28 RX ADMIN — HYDROMORPHONE HYDROCHLORIDE 0.4 MG: 1 INJECTION, SOLUTION INTRAMUSCULAR; INTRAVENOUS; SUBCUTANEOUS at 14:30

## 2024-03-28 RX ADMIN — Medication 100 MCG: at 15:25

## 2024-03-28 RX ADMIN — HYDROMORPHONE HYDROCHLORIDE 0.5 MG: 1 INJECTION, SOLUTION INTRAMUSCULAR; INTRAVENOUS; SUBCUTANEOUS at 18:47

## 2024-03-28 RX ADMIN — TRANEXAMIC ACID 1000 MG: 1 INJECTION, SOLUTION INTRAVENOUS at 13:05

## 2024-03-28 RX ADMIN — IBUPROFEN 600 MG: 600 TABLET, FILM COATED ORAL at 22:03

## 2024-03-28 RX ADMIN — ESMOLOL HYDROCHLORIDE 20 MG: 10 INJECTION, SOLUTION INTRAVENOUS at 16:36

## 2024-03-28 RX ADMIN — LIDOCAINE HYDROCHLORIDE 4 ML: 40 SOLUTION TOPICAL at 12:34

## 2024-03-28 RX ADMIN — ESMOLOL HYDROCHLORIDE 30 MG: 10 INJECTION, SOLUTION INTRAVENOUS at 17:35

## 2024-03-28 RX ADMIN — Medication 150 MCG: at 12:59

## 2024-03-28 RX ADMIN — SODIUM CHLORIDE, POTASSIUM CHLORIDE, SODIUM LACTATE AND CALCIUM CHLORIDE 100 ML/HR: 600; 310; 30; 20 INJECTION, SOLUTION INTRAVENOUS at 20:17

## 2024-03-28 RX ADMIN — SODIUM CHLORIDE, POTASSIUM CHLORIDE, SODIUM LACTATE AND CALCIUM CHLORIDE 100 ML/HR: 600; 310; 30; 20 INJECTION, SOLUTION INTRAVENOUS at 10:35

## 2024-03-28 RX ADMIN — HYDROMORPHONE HYDROCHLORIDE 0.5 MG: 1 INJECTION, SOLUTION INTRAMUSCULAR; INTRAVENOUS; SUBCUTANEOUS at 18:57

## 2024-03-28 SDOH — SOCIAL STABILITY: SOCIAL INSECURITY: HAVE YOU HAD THOUGHTS OF HARMING ANYONE ELSE?: NO

## 2024-03-28 SDOH — SOCIAL STABILITY: SOCIAL INSECURITY: DO YOU FEEL ANYONE HAS EXPLOITED OR TAKEN ADVANTAGE OF YOU FINANCIALLY OR OF YOUR PERSONAL PROPERTY?: NO

## 2024-03-28 SDOH — SOCIAL STABILITY: SOCIAL INSECURITY: WERE YOU ABLE TO COMPLETE ALL THE BEHAVIORAL HEALTH SCREENINGS?: YES

## 2024-03-28 SDOH — SOCIAL STABILITY: SOCIAL INSECURITY: DO YOU FEEL UNSAFE GOING BACK TO THE PLACE WHERE YOU ARE LIVING?: NO

## 2024-03-28 SDOH — SOCIAL STABILITY: SOCIAL INSECURITY: DOES ANYONE TRY TO KEEP YOU FROM HAVING/CONTACTING OTHER FRIENDS OR DOING THINGS OUTSIDE YOUR HOME?: NO

## 2024-03-28 SDOH — SOCIAL STABILITY: SOCIAL INSECURITY: ARE YOU OR HAVE YOU BEEN THREATENED OR ABUSED PHYSICALLY, EMOTIONALLY, OR SEXUALLY BY ANYONE?: NO

## 2024-03-28 SDOH — SOCIAL STABILITY: SOCIAL INSECURITY: ARE THERE ANY APPARENT SIGNS OF INJURIES/BEHAVIORS THAT COULD BE RELATED TO ABUSE/NEGLECT?: NO

## 2024-03-28 SDOH — SOCIAL STABILITY: SOCIAL INSECURITY: HAS ANYONE EVER THREATENED TO HURT YOUR FAMILY OR YOUR PETS?: NO

## 2024-03-28 SDOH — SOCIAL STABILITY: SOCIAL INSECURITY: ABUSE: ADULT

## 2024-03-28 ASSESSMENT — COGNITIVE AND FUNCTIONAL STATUS - GENERAL
CLIMB 3 TO 5 STEPS WITH RAILING: A LITTLE
TOILETING: A LITTLE
DAILY ACTIVITIY SCORE: 22
PATIENT BASELINE BEDBOUND: NO
DRESSING REGULAR LOWER BODY CLOTHING: A LITTLE
MOBILITY SCORE: 22
WALKING IN HOSPITAL ROOM: A LITTLE

## 2024-03-28 ASSESSMENT — LIFESTYLE VARIABLES
AUDIT-C TOTAL SCORE: 0
AUDIT-C TOTAL SCORE: 0
SKIP TO QUESTIONS 9-10: 1
HOW OFTEN DO YOU HAVE A DRINK CONTAINING ALCOHOL: NEVER
HOW MANY STANDARD DRINKS CONTAINING ALCOHOL DO YOU HAVE ON A TYPICAL DAY: PATIENT DOES NOT DRINK
HOW OFTEN DO YOU HAVE 6 OR MORE DRINKS ON ONE OCCASION: NEVER

## 2024-03-28 ASSESSMENT — PAIN SCALES - GENERAL
PAINLEVEL_OUTOF10: 8
PAINLEVEL_OUTOF10: 5 - MODERATE PAIN
PAINLEVEL_OUTOF10: 4
PAINLEVEL_OUTOF10: 8
PAINLEVEL_OUTOF10: 5 - MODERATE PAIN
PAINLEVEL_OUTOF10: 0 - NO PAIN
PAINLEVEL_OUTOF10: 10 - WORST POSSIBLE PAIN
PAIN_LEVEL: 0
PAINLEVEL_OUTOF10: 0 - NO PAIN
PAINLEVEL_OUTOF10: 8
PAINLEVEL_OUTOF10: 5 - MODERATE PAIN
PAINLEVEL_OUTOF10: 0 - NO PAIN
PAINLEVEL_OUTOF10: 8
PAINLEVEL_OUTOF10: 10 - WORST POSSIBLE PAIN
PAINLEVEL_OUTOF10: 8

## 2024-03-28 ASSESSMENT — ACTIVITIES OF DAILY LIVING (ADL)
LACK_OF_TRANSPORTATION: NO
BATHING: INDEPENDENT
TOILETING: NEEDS ASSISTANCE
HEARING - LEFT EAR: FUNCTIONAL
FEEDING YOURSELF: INDEPENDENT
ADEQUATE_TO_COMPLETE_ADL: YES
WALKS IN HOME: INDEPENDENT
HEARING - RIGHT EAR: FUNCTIONAL
DRESSING YOURSELF: INDEPENDENT
GROOMING: INDEPENDENT
JUDGMENT_ADEQUATE_SAFELY_COMPLETE_DAILY_ACTIVITIES: YES
PATIENT'S MEMORY ADEQUATE TO SAFELY COMPLETE DAILY ACTIVITIES?: YES

## 2024-03-28 ASSESSMENT — PATIENT HEALTH QUESTIONNAIRE - PHQ9
2. FEELING DOWN, DEPRESSED OR HOPELESS: NOT AT ALL
SUM OF ALL RESPONSES TO PHQ9 QUESTIONS 1 & 2: 0
1. LITTLE INTEREST OR PLEASURE IN DOING THINGS: NOT AT ALL

## 2024-03-28 ASSESSMENT — COLUMBIA-SUICIDE SEVERITY RATING SCALE - C-SSRS
1. IN THE PAST MONTH, HAVE YOU WISHED YOU WERE DEAD OR WISHED YOU COULD GO TO SLEEP AND NOT WAKE UP?: NO
6. HAVE YOU EVER DONE ANYTHING, STARTED TO DO ANYTHING, OR PREPARED TO DO ANYTHING TO END YOUR LIFE?: NO
2. HAVE YOU ACTUALLY HAD ANY THOUGHTS OF KILLING YOURSELF?: NO

## 2024-03-28 ASSESSMENT — PAIN DESCRIPTION - DESCRIPTORS: DESCRIPTORS: ACHING;STABBING

## 2024-03-28 ASSESSMENT — PAIN SCALES - WONG BAKER
WONGBAKER_NUMERICALRESPONSE: HURTS LITTLE MORE
WONGBAKER_NUMERICALRESPONSE: HURTS WORST

## 2024-03-28 NOTE — ANESTHESIA PREPROCEDURE EVALUATION
Patient: Paola Black    Procedure Information       Date/Time: 03/28/24 1200    Procedures:       Bilateral TMJ Replacement (Bilateral)      Bilateral Temporomandibular Joint Arthroplasty (Bilateral)      Excision Adipose Tissue Graft Torso      Application Arch Bars (Bilateral)    Location: [facility] OR 04 / [facility] OR    Surgeons: Marco A Sahu MD, DDS            Relevant Problems   GI   (+) Irritable bowel syndrome with constipation       Clinical information reviewed:   Tobacco  Allergies  Meds   Med Hx  Surg Hx  OB Status  Fam Hx  Soc   Hx        NPO Detail:  NPO/Void Status  Carbohydrate Drink Given Prior to Surgery? : N  Date of Last Liquid: 03/27/24  Time of Last Liquid: 2200  Date of Last Solid: 03/27/24  Time of Last Solid: 2100  Last Intake Type: Clear fluids  Time of Last Void: 1014         Physical Exam    Airway  Mallampati: II  TM distance: >3 FB  Neck ROM: full     Cardiovascular   Rhythm: regular  Rate: normal     Dental    Pulmonary   Breath sounds clear to auscultation     Abdominal            Anesthesia Plan    History of general anesthesia?: yes  History of complications of general anesthesia?: no    ASA 2     general     intravenous induction   Anesthetic plan and risks discussed with patient.    Plan discussed with CAA.

## 2024-03-28 NOTE — OP NOTE
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]     * Arthralgia of bilateral temporomandibular joint [M26.623]     Procedures  Bilateral TMJ Replacement  76593 - ND ARTHRP TMPRMAND JOINT W/PROSTHETIC REPLACEMENT    Bilateral Temporomandibular Joint Arthroplasty  36750 - ND ARTHRP TMPRMAND JOINT W/PROSTHETIC REPLACEMENT    Bilateral Temporomandibular Joint Arthroplasty  50738 - ND APPL INTERDENTAL FIXATION DEVICE NON-FX/DISLC    Excision Adipose Tissue Graft Bilateral Neck  41429 - ND GRAFTING OF AUTOLOGOUS SOFT TISS BY DIRECT EXC      Surgeons      * Marco A Sahu - Primary    Resident/Fellow/Other Assistant:  Surgeon(s) and Role:    Procedure Summary  Anesthesia: General  ASA: II  Anesthesia Staff: Anesthesiologist: Sudeep Neumann MD; Jose Woodall MD  C-AA: MICHELLE Lopez; MICHELLE Awad  Estimated Blood Loss: 150 mL  Intra-op Medications:   Administrations occurring from 1200 to 1530 on 24:   Medication Name Total Dose   sodium chloride 0.9 % irrigation solution 4,000 mL   BUPivacaine HCl (Marcaine) 0.5 % (5 mg/mL) injection 15 mL   lactated Ringer's infusion Cannot be calculated              Anesthesia Record               Intraprocedure I/O Totals          Intake    Remifentanil Drip 0.00 mL    The total shown is the total volume documented since Anesthesia Start was filed.    Tranexamic Acid 0.00 mL    The total shown is the total volume documented since Anesthesia Start was  filed.    lactated Ringer's infusion 1600.00 mL    Total Intake 1600 mL       Output    Urine 300 mL    Est. Blood Loss 150 mL    Total Output 450 mL       Net    Net Volume 1150 mL          Specimen:   ID Type Source Tests Collected by Time   1 : RIGHT TMJ DISC AND CONDILE Tissue SOFT TISSUE RESECTION SURGICAL PATHOLOGY EXAM Marco A Sahu MD, DDS 3/28/2024 1412   2 : LEFT TMJ DISC AND CONDILE Tissue SOFT TISSUE RESECTION SURGICAL PATHOLOGY EXAM Marco A Sahu MD, DDS 3/28/2024 1626        Staff:   Circulator: Namrata Mendoza, SERVANDO; Soco Guillen, SERVANDO; Alexus Bedolla, RN; Karey Stern RN  Relief Circulator: Angel Pollack RN  Relief Scrub: Ruth Guan; Yasmani Sotelo  Scrub Person: Jennifer Pierce; Davida Andrade RN         Drains and/or Catheters:   [REMOVED] Urethral Catheter Double-lumen 16 Fr. (Removed)       Tourniquet Times:         Implants:  Implants       Type Name Action Serial No.      ENT Implant TMJ IMPLANTS BILATERAL CHARGE - [id number] Implanted       50MM BIOMET MANDIBULAR COMPONENT, LEFT, NARROW, TITANIUM Implanted       50MM BIOMET MANDIBULAR TRIAL, RIGHT, OFFSET, TITANIUM Implanted      Screw SCREW, IMF 2.0 X 7 SD HEX - [id number] Implanted      Screw SCREW, 2.2 X 9 IMF - [id number] Implanted      Screw SCREW, TMJ CONDYLE 2.7 X 8 - [id number] Implanted      Screw SCREW, TMJ FOSSA 2 X 9 - [id number] Implanted       24GA X 6IN IMF LIGATURE WIRE, PRE-STRETCHED, PRE-CUT Implanted       SUTURE PLN GUT 5-0 FAST ABSBG PC-1 Implanted               Findings: Degenerative changes of b/l mandibular condyles, degenerative changes of b/l TMJ discs. Stable and reproducible occlusion after placement of alloplastic joints.     Indications: Paola Black is an 28 y.o. female who is having surgery for Articular disc disorder of TMJ [M26.63]  Arthralgia of TMJ(Bilat) [M26.62]. She has had multiple years of severe bilateral TMJ pain refractory to more conservative measures. She has had clicking,  "popping and grinding in her joints b/l. She presents to day for replacement of bilateral TMJ joints to treat her symptoms.     The patient was seen in the preoperative area. The risks, benefits, complications, treatment options, non-operative alternatives, expected recovery and outcomes were discussed with the patient. The possibilities of reaction to medication, pulmonary aspiration, injury to surrounding structures, bleeding, recurrent infection, the need for additional procedures, failure to diagnose a condition, and creating a complication requiring transfusion or operation were discussed with the patient. It was discussed with patient that we would use custom fossa components with stock condyle componenets and that this is off label use, however we believe it would give her the best fit and avoid metal allergy. The risks of off label use were discussed and that it is unknown what the long term results could be. Based on the materials used this should not be an issue but we cannot know for sure. The risks of possible permanent facial nerve damage were also discussed. The patient concurred with the proposed plan, giving informed consent.  The site of surgery was properly noted/marked if necessary per policy. The patient has been actively warmed in preoperative area. Preoperative antibiotics have been ordered and given within 1 hours of incision. Venous thrombosis prophylaxis are not indicated.    Procedure Details:     The patient was greeted in the pre-op holding area, where all preoperative risks and complications were reviewed. Later, the patient was brought into the operating room by the anesthesia staff and was placed in the supine position. A \"Time out\" was performed to confirmed patient's identity and the procedure to be performed. The patient was then induced for general anesthesia and intubated with a nasoendotracheal tube. Following intubation, the nasoendotracheal tube was secured by the surgical team " using silk suture, hair was shaved in the temporal area for preauricular sterility, and a standard head wrap was placed. A throat pack was placed. MMF screws were placed, 4 in maxilla and 4 in mandible.The patient was prepped and draped in standard oral and maxillofacial surgery fashion. Xeroform placed in ears. Oral cavity and nose was occluded with Ioban.     Attention was then turned to the right neck where planned incision marked with marking pen ~2 cm below and posterior to angle of mandible. Preauricular incision was also marked.  1 % lido with 1:100 K epi was injected in subcutaneous plane at preauricular incision site.      An incision was made at retromandibular incision using a 15 blade extending from the skin to the subcutaneous fat layer.  The platysma was incised and a subplatysmal flap was elevated using a combination of blunt and sharp dissection.  A checkpoint nerve stimulator was used to monitor marginal mandibular nerve after confirming with anesthesia that patient was not paralyzed. Marginal mandibular nerve was visualized, was retracted superiorly out of the plane of dissection, and there was no indication that it was violated during testing with the nerve stimulator. Dissection proceeded to the inferior border of the mandible/angle of the mandible. Once the pterygoid masseteric sling of the mandible was visualized and the bony mandible was palpable, the pterygoid masseteric sling was incised using electrocautery. Subperiosteal dissection was carried out to expose the mandibular angle, posterior border, lateral ramus, and superiorly to expose sigmoid notch. The prefabricated cutting guide was placed and well adapted and in stable position at the left mandible. The stock condyle cutting guide was secured to mandible using shayy drill and copious irrigation to drill hole and two biomet screws.      Attention then turned to the right preauricular incision. A 15 blade was used to incise through  skin and subcutaneous tissue. Above the arch, bovie used to dissect down to superficial layer of temporalis fascia. Once this layer reached, the zygomatic arch was palpated and incision was made through superficial temporalis fascia down to fat later and carried to the arch. Curved iris scissors used to dissect inferior to the arch down to the same depth as above the arch. Then, periosteal elevator used to maintain contact on bony arch while dissecting anteriorly along the arch to expose the fossa and joint capsule. Capsule then incised with Bovie and dissection then proceeded inferiorly along to exposed condyle and fossa. The disc was then removed with bovie electorcautuery and mosquito hemostat. This allowed for superior repositioning of the condyle. Condylar neck retractors then used to expose the condyle. Guide was not yet visible.  Attention then turned to retromandibular incision where cutting line of guide was clearly visible. Sonopet saw used to make condylar osteotomy along the guide. Periosteal elevator and Bovie used to free muscular attachments from condyle and condyle removed. The glenoid fossa and articular eminence were then cleaned of any soft tissue using periosteal elevator. The biomet cutting guide was then removed. Sites irrigated with copious irrisept and packed with irrisept soaked gauze.      The same procedure was then performed on the left.    On the left the TMJ concepts custom fossa was then put into place and noted to fit very well with near perfect adaptation. There was no rocking while seated with condylar seating instrument. Fossa was fixated using shayy drill and copious irrigation and 4 TMJ concepts screws according to plan. The same procedure was then performed on the right and the same stability was noted.         The right and left neck were packed and closed with occlusive Ioban dressing. Patient redraped. Two team members went dirty. The patient was placed in intermaxillary  fixation with 25 gauge wire. Occlusion was very stable with midlines on and no open bite and no splint was required.       Dirty drapes removed. Team members rescrubbed. Occlusive dressing was placed over the oral cavity and nose.      Attention turned to the left side. Copious irrisept irrigation of wounds. The size 50 thin ramus component was then seated through the retromandibular incision and positioned so the condylar component was in appropriate position in the fossa component and well adapted to the ramus. The component was then held in place with bioment ramus clamp and it was fixated using styker drill with copious irrigation to drill holes and appropriate biomet screws to fixate the component. After fixation condyle was noted to be well seated in fossa and all components stable.     The same procedure was then performed on the right. However, on the right a better fit was noted with the size 50 standard offset due to more medial position of ramus and lateral position of fossa component. This offset component was then seated through the retromandibular incision and positioned so the condylar component was in appropriate position in the fossa component and well adapted to the ramus. The component was then held in place with bioment ramus clamp and it was fixated using styker drill with copious irrigation to drill holes and appropriate biomet screws to fixate the component. After fixation condyle was noted to be well seated in fossa and all components stable.     All incisions copiously irrigated with irrisept.      Supraplatsymal fat was then harvested at each retromandibular incision site using blunt and sharp dissection with curved tenotomy scissors.  The fat was then packed into the right and left joint spaces between condylar head and fossa component. Floseal placed in b/l preauricular and neck wounds.     The preauricular, right and left neck incisions were closed in a layered fashion using 3-0 vicryl  sutures to close the sling, fascial layers, platysma, and then 4-0 vicryl to close deep dermal layer.  Skin was closed with 5-0 fast gut sutures in running continuous fashion.      Intraorally the IMF wires were removed. Occlusion noted to be stable and reproducible and JAMES 40 mm. IMF screws removed. The throat pack was removed. The oral cavity was suctioned and an OG tube was placed with the gastric contents suctioned. Xeroform removed from ears. The patient was carefully cleaned with a wet and dry sponge.  Bacitracin applied to neck wounds. A Jaw bra and fluffs dressing were placed. Pressure dressing placed for mentalis suspension     Care of the patient was then turned over to the anesthesia team. The patient was emerged from anesthesia, extubated and was taken to PACU in stable condition.       Dr. Sahu was present for all critical portions of the case.    Complications:  None; patient tolerated the procedure well.    Disposition: PACU - hemodynamically stable.  Condition: stable         Additional Details: NA    Attending Attestation:     Marco A Sahu  Phone Number: [phone]

## 2024-03-28 NOTE — ANESTHESIA POSTPROCEDURE EVALUATION
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      Arthralgia of bilateral temporomandibular joint      (Articular disc disorder of TMJ [M26.63])      (Arthralgia of TMJ(Bilat) [M26.62])    Surgeons: Marco A Sahu MD, DDS Responsible Provider: Jose Woodall MD    Anesthesia Type: general ASA Status: 2            Anesthesia Type: general    Vitals Value Taken Time   /83 03/28/24 1755   Temp 98 03/28/24 1755   Pulse 36.4 03/28/24 1755   Resp 16 03/28/24 1755   SpO2 100% 03/28/24 1755       Anesthesia Post Evaluation    Patient location during evaluation: bedside  Patient participation: complete - patient cannot participate  Level of consciousness: awake  Pain score: 0  Pain management: adequate  Airway patency: patent  Cardiovascular status: acceptable  Respiratory status: acceptable  Hydration status: acceptable  Postoperative Nausea and Vomiting: none        No notable events documented.

## 2024-03-28 NOTE — ANESTHESIA PROCEDURE NOTES
Airway  Date/Time: 3/28/2024 12:38 PM  Urgency: elective    Airway not difficult    Staffing  Performed: MICHELLE   Authorized by: Jose Woodall MD    Performed by: MICHELLE Awad  Patient location during procedure: OR    Indications and Patient Condition  Indications for airway management: anesthesia and airway protection  Spontaneous Ventilation: absent  Sedation level: deep  Preoxygenated: yes  Patient position: sniffing  MILS maintained throughout  Mask difficulty assessment: 1 - vent by mask    Final Airway Details  Final airway type: endotracheal airway      Successful airway: BARBY tube  Cuffed: yes   Successful intubation technique: direct laryngoscopy  Endotracheal tube insertion site: right naris  Blade: Josefina  Blade size: #3  Cormack-Lehane Classification: grade I - full view of glottis  Placement verified by: chest auscultation   Measured from: nares  ETT to nares (cm): 26  Number of attempts at approach: 1    Additional Comments  Serial dilation with 30,32,34 F nasal trumpets followed by nasal intubation with magil forceps

## 2024-03-29 ENCOUNTER — PHARMACY VISIT (OUTPATIENT)
Dept: PHARMACY | Facility: CLINIC | Age: 29
End: 2024-03-29
Payer: MEDICAID

## 2024-03-29 ENCOUNTER — APPOINTMENT (OUTPATIENT)
Dept: RADIOLOGY | Facility: HOSPITAL | Age: 29
End: 2024-03-29
Payer: COMMERCIAL

## 2024-03-29 VITALS
HEART RATE: 89 BPM | RESPIRATION RATE: 16 BRPM | SYSTOLIC BLOOD PRESSURE: 109 MMHG | HEIGHT: 66 IN | BODY MASS INDEX: 44.82 KG/M2 | WEIGHT: 278.88 LBS | OXYGEN SATURATION: 95 % | TEMPERATURE: 98.1 F | DIASTOLIC BLOOD PRESSURE: 73 MMHG

## 2024-03-29 PROCEDURE — 2500000002 HC RX 250 W HCPCS SELF ADMINISTERED DRUGS (ALT 637 FOR MEDICARE OP, ALT 636 FOR OP/ED): Performed by: DENTIST

## 2024-03-29 PROCEDURE — 70486 CT MAXILLOFACIAL W/O DYE: CPT | Performed by: RADIOLOGY

## 2024-03-29 PROCEDURE — 71045 X-RAY EXAM CHEST 1 VIEW: CPT

## 2024-03-29 PROCEDURE — 70486 CT MAXILLOFACIAL W/O DYE: CPT

## 2024-03-29 PROCEDURE — 2500000001 HC RX 250 WO HCPCS SELF ADMINISTERED DRUGS (ALT 637 FOR MEDICARE OP): Performed by: DENTIST

## 2024-03-29 PROCEDURE — 2500000004 HC RX 250 GENERAL PHARMACY W/ HCPCS (ALT 636 FOR OP/ED): Performed by: DENTIST

## 2024-03-29 PROCEDURE — RXMED WILLOW AMBULATORY MEDICATION CHARGE

## 2024-03-29 PROCEDURE — G0378 HOSPITAL OBSERVATION PER HR: HCPCS

## 2024-03-29 PROCEDURE — 71045 X-RAY EXAM CHEST 1 VIEW: CPT | Performed by: RADIOLOGY

## 2024-03-29 PROCEDURE — 7100000011 HC EXTENDED STAY RECOVERY HOURLY - NURSING UNIT

## 2024-03-29 RX ORDER — TRIPROLIDINE/PSEUDOEPHEDRINE 2.5MG-60MG
600 TABLET ORAL EVERY 8 HOURS PRN
Qty: 630 ML | Refills: 0 | Status: SHIPPED | OUTPATIENT
Start: 2024-03-29 | End: 2024-04-05

## 2024-03-29 RX ORDER — CYCLOBENZAPRINE HCL 10 MG
10 TABLET ORAL DAILY
Qty: 14 TABLET | Refills: 0 | Status: SHIPPED | OUTPATIENT
Start: 2024-03-29 | End: 2024-04-12

## 2024-03-29 RX ORDER — OXYCODONE HCL 5 MG/5 ML
5 SOLUTION, ORAL ORAL EVERY 6 HOURS PRN
Qty: 90 ML | Refills: 0 | Status: SHIPPED | OUTPATIENT
Start: 2024-03-29 | End: 2024-04-03

## 2024-03-29 RX ORDER — ONDANSETRON HYDROCHLORIDE 4 MG/5ML
4 SOLUTION ORAL 2 TIMES DAILY PRN
Qty: 50 ML | Refills: 0 | Status: SHIPPED | OUTPATIENT
Start: 2024-03-29 | End: 2024-04-03

## 2024-03-29 RX ORDER — BACITRACIN 500 [USP'U]/G
OINTMENT TOPICAL 2 TIMES DAILY
Qty: 28.4 G | Refills: 0 | Status: SHIPPED | OUTPATIENT
Start: 2024-03-29

## 2024-03-29 RX ORDER — CHLORHEXIDINE GLUCONATE ORAL RINSE 1.2 MG/ML
15 SOLUTION DENTAL 3 TIMES DAILY
Qty: 946 ML | Refills: 0 | Status: SHIPPED | OUTPATIENT
Start: 2024-03-29 | End: 2024-04-20

## 2024-03-29 RX ORDER — AMOXICILLIN AND CLAVULANATE POTASSIUM 600; 42.9 MG/5ML; MG/5ML
875 POWDER, FOR SUSPENSION ORAL 2 TIMES DAILY
Qty: 200 ML | Refills: 0 | Status: SHIPPED | OUTPATIENT
Start: 2024-03-29 | End: 2024-04-04

## 2024-03-29 RX ADMIN — DEXAMETHASONE SODIUM PHOSPHATE 8 MG: 4 INJECTION, SOLUTION INTRAMUSCULAR; INTRAVENOUS at 05:53

## 2024-03-29 RX ADMIN — BACITRACIN ZINC: 500 OINTMENT TOPICAL at 09:11

## 2024-03-29 RX ADMIN — OXYCODONE HYDROCHLORIDE 10 MG: 5 TABLET ORAL at 06:06

## 2024-03-29 RX ADMIN — IBUPROFEN 600 MG: 200 SUSPENSION ORAL at 09:44

## 2024-03-29 RX ADMIN — AMPICILLIN SODIUM AND SULBACTAM SODIUM 3 G: 2; 1 INJECTION, POWDER, FOR SOLUTION INTRAMUSCULAR; INTRAVENOUS at 05:52

## 2024-03-29 RX ADMIN — ENOXAPARIN SODIUM 40 MG: 40 INJECTION SUBCUTANEOUS at 09:10

## 2024-03-29 RX ADMIN — CHLORHEXIDINE GLUCONATE 0.12% ORAL RINSE 15 ML: 1.2 LIQUID ORAL at 09:09

## 2024-03-29 RX ADMIN — OXYCODONE HYDROCHLORIDE 10 MG: 5 TABLET ORAL at 12:17

## 2024-03-29 RX ADMIN — PANTOPRAZOLE SODIUM 20 MG: 20 TABLET, DELAYED RELEASE ORAL at 06:06

## 2024-03-29 RX ADMIN — LINACLOTIDE 72 MCG: 72 CAPSULE, GELATIN COATED ORAL at 09:09

## 2024-03-29 RX ADMIN — AMPICILLIN SODIUM AND SULBACTAM SODIUM 3 G: 2; 1 INJECTION, POWDER, FOR SOLUTION INTRAMUSCULAR; INTRAVENOUS at 01:08

## 2024-03-29 ASSESSMENT — COGNITIVE AND FUNCTIONAL STATUS - GENERAL
MOBILITY SCORE: 22
TOILETING: A LITTLE
WALKING IN HOSPITAL ROOM: A LITTLE
DRESSING REGULAR LOWER BODY CLOTHING: A LITTLE
DAILY ACTIVITIY SCORE: 22
CLIMB 3 TO 5 STEPS WITH RAILING: A LITTLE

## 2024-03-29 ASSESSMENT — PAIN SCALES - GENERAL
PAINLEVEL_OUTOF10: 5 - MODERATE PAIN
PAINLEVEL_OUTOF10: 8
PAINLEVEL_OUTOF10: 7

## 2024-03-29 ASSESSMENT — ACTIVITIES OF DAILY LIVING (ADL): LACK_OF_TRANSPORTATION: NO

## 2024-03-29 ASSESSMENT — PAIN - FUNCTIONAL ASSESSMENT: PAIN_FUNCTIONAL_ASSESSMENT: 0-10

## 2024-03-29 ASSESSMENT — PAIN DESCRIPTION - LOCATION: LOCATION: JAW

## 2024-03-29 NOTE — CARE PLAN
Problem: Pain  Goal: My pain/discomfort is manageable  Outcome: Progressing     Problem: Safety  Goal: Patient will be injury free during hospitalization  Outcome: Progressing  Goal: I will remain free of falls  Outcome: Progressing     Problem: Daily Care  Goal: Daily care needs are met  Outcome: Progressing     Problem: Psychosocial Needs  Goal: Demonstrates ability to cope with hospitalization/illness  Outcome: Progressing  Goal: Collaborate with me, my family, and caregiver to identify my specific goals  Outcome: Progressing  Flowsheets (Taken 3/28/2024 2012)  Cultural Requests During Hospitalization: none  Spiritual Requests During Hospitalization: none     Problem: Discharge Barriers  Goal: My discharge needs are met  Outcome: Progressing

## 2024-03-29 NOTE — DISCHARGE SUMMARY
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 Patient is not fully awake. She is complaining of pain 10/10, difficulty breathing and epigastric pain, Patient  didn't void, pass gas or have a bowel movement. Initiated PO.    HOD #2, POD # 1 S/P B/L TMJ replacement. The patient is stable and pain is controlled. The patient was able to ambulate and urinate. The patient is stable for discharge.        Pertinent Physical Exam At Time of Discharge  Physical Exam  HENT:      Head:      Comments: Bilateral head and neck swelling as a result of the surgery. Incisions intact and hemostasis.      Nose: Nose normal.      Mouth/Throat:      Comments: Intraoral no bleeding. Occlusion stable and reproducible. JAMES~20mm likely due to swelling. Normal and moist mucous membranes, no masses. No fractures or avulsion of teeth. Stable and reproducible occlusion.  Maximal incisal opening ~35mm.  Patient tolerating own secretions.  Eyes:      Pupils: Pupils are equal, round, and reactive to light.   Cardiovascular:      Rate and Rhythm: Normal rate and regular rhythm.   Pulmonary:      Effort: Pulmonary effort is normal.      Comments: Breathing comfortably on room air   Abdominal:      General: Abdomen is flat.   Musculoskeletal:         General: Normal range of motion.      Cervical back: Normal range of motion.      Comments: No leg swelling, no leg tenderness   Skin:     General: Skin is warm.   Neurological:      General: No focal deficit present.      Mental Status: She is alert and oriented to person, place, and time.      Comments: CN VII grossly intact bilaterally, CN V3 intact bilaterally.    Psychiatric:         Mood and Affect:  Mood normal.         Behavior: Behavior normal.         Home Medications     Medication List      START taking these medications     amoxicillin-pot clavulanate 875-125 mg tablet; Commonly known as:   Augmentin; Take 1 tablet (875 mg) by mouth 2 times a day for 6 days.   bacitracin 500 unit/gram ointment; Apply topically 2 times a day for 7   days.   chlorhexidine 0.12 % solution; Commonly known as: Peridex; Use 15 mL in   the mouth or throat 3 times a day for 14 days.   cyclobenzaprine 10 mg tablet; Commonly known as: Flexeril; Take 1 tablet   (10 mg) by mouth once daily for 14 days.   ibuprofen 100 mg/5 mL suspension; Take 30 mL (600 mg) by mouth every 8   hours if needed for mild pain (1 - 3) for up to 7 days.   ondansetron 4 mg/5 mL solution; Commonly known as: Zofran; Take 5 mL (4   mg) by mouth 2 times a day as needed for nausea or vomiting for up to 5   days.   oxyCODONE 5 mg/5 mL solution; Commonly known as: Roxicodone; Take 5 mL   (5 mg) by mouth every 6 hours if needed for severe pain (7 - 10) for up to   5 days.     CONTINUE taking these medications     amitriptyline 10 mg tablet; Commonly known as: Elavil   ergocalciferol 1.25 MG (22237 UT) capsule; Commonly known as: Vitamin   D-2   ferrous sulfate (325 mg ferrous sulfate) tablet   linaCLOtide 72 mcg capsule; Commonly known as: Linzess; Take 1 capsule   (72 mcg) by mouth once daily.   medroxyPROGESTERone 150 mg/mL injection; Commonly known as: Depo-Provera   multivitamin tablet     ASK your doctor about these medications     EPINEPHrine 0.3 mg/0.3 mL injection syringe; Commonly known as: Epipen;   USE AS DIRECTED IN CASE OF A SEVERE ALLERGIC REACTION   omeprazole OTC 20 mg EC tablet; Commonly known as: PriLOSEC OTC; TAKE 1   TABLET (20 MG) BY MOUTH ONCE DAILY. DO NOT CRUSH, CHEW, OR SPLIT. *PA SENT       Outpatient Follow-Up  The patient return to clinic on 4/2/24 AM for follow-up     Adryan Alberts DMD  Oral and maxillofacial surgery intern    Pager [phone]

## 2024-03-29 NOTE — DISCHARGE INSTRUCTIONS
"MEDICATIONS  ° Pain medication should be taken only during the time that you feel significant discomfort. If the pain is severe, the prescription medication can be used. However, if only mild discomfort is experienced, try to use a less potent, over the counter medication such as Advil (ibuprofen and/or Tylenol (Acetaminophen). These can be taken in crushed tablets or in liquid form.  ° Antibiotics: This medicine should be taken at the appropriate interval as described on the bottle. Be sure not to miss any doses and take the medicine until it is gone.  ° Lip ointment:  Keep enough ointment, such as Vaseline, on the lips to keep them looking wet.    WOUND CARE  ° We recommend using ice packs on your face for the first 48 hours to reduce swelling and bruising over the affected areas. We recommend 20 minutes on and 20 minutes off. Make sure to wrap the ice pack in a towel - do not apply directly to skin.  Cold or heat directly on numb areas can lead to permanent damage of the skin.  ° Keep your head elevated, at least 30 degrees, to minimize swelling. This may require you to sleep in a reclining chair or using several pillows in bed.  ° Do not shower if you have a dressing on. Once the dressing is removed, keep the wound clean and dry for five days. On the 6th day, you may get it wet but no direct streams of water (for example, in the shower) and be careful to pat dry and not rub the wound.   ° If you have sutures, you may clean the sutures only with a cotton swab and a solution of ½ hydrogen peroxide and ½ water. We highly recommend this as the sutures dry out and become \"crusty\" with time.  ° Keep a thin coating of antibiotic ointment or Vaseline on the surgical sites (Polysporin, Bacitracin).     PHYSICAL ACTIVITY  ° Following surgery you will find that your energy level is much lower. This will take some time to return to normal.  ° Although you just had surgery, it is important that you do not stay in bed while " awake to minimize the chances of leg clots. We highly encourage occasional standing and walking as tolerated.  ° Try to transfer to a chair instead and walk throughout the house as much as you can tolerate. Try to be as active as possible. The swelling will worsen over the next 3-4 days after surgery and is expected. The swelling will begin to subside over the next few days.  ° Physical exercise such as walking or running can begin 2 or 3 weeks after surgery.  ° When you attempt to return to normal physical activity start slowly and work up to your normal level.  ° It is important that you take deep breaths after surgery to help prevent development of pneumonia. Try to take 10-20 deep breaths every hour while you are awake. Encourage yourself to gently cough if you feel mucous accumulating in the back of your throat or lungs.     DIET  ° Maintain a no chew diet until follow up.  ° Your diet will be slowly advanced accordingly during follow up.   ° Some sort of diet supplement such as Boost, Ensure, or similar substitutes may be used to increase calorie intake.    DRINKING  Keep hydrated by drinking at least 8-9 glasses of liquids a day. This is extremely important to prevent dehydration. Signs of dehydration are: dry mouth, dark yellow urine in small amounts, and dizziness with change in position or increased feeling of weakness/tiredness.  ° Do not use a straw for the first day or two since this can create more bleeding and may be difficult due to numbness.  ° Attempt to drink from a cup as soon as possible. While some fluid may spill when drinking, a cup is still the most effective way for taking fluids. The use of a ``sippy cup´´ like those used by toddlers may be helpful for the first few days.  ° Place a towel under your chin and pour a small amount of liquid into the cup. Tip your head back slightly and attempt to open your mouth a tiny bit while pouring the fluid into your mouth. Pour slowly and take in a  small amount of fluid. Take the cup away from your mouth. If necessary use slight finger pressure to place your lips together and attempt to swallow. This will be difficult at first, but you will find that it will become much easier in a day or two.  ° If drinking from a cup seems to be impossible, continue using the syringe with the tubing attached. Another alternative is to obtain a squeeze bottle to squirt fluid into your mouth.    JAW EXERCISES  ° You will be given appropriate advice regarding exercise of your jaw following your joint surgery.  ° During the first 24 hours after surgery no movement is permitted. Following that the exercises should be very gentle and slowly ``built up´´ over the course of 2-3 weeks.     CHANGES IN BITE  ° It is not uncommon for your bite you feel slightly different than prior to surgery. This will gradually settle over the coming weeks as the swelling in the joint reduces.     HEARING CHANGES  ° Patients may occasionally notice deafness or decreased hearing after surgery. This may occur due to swelling and accumulation of fluid and/or a blood clot in the ear canal.   ° This will subside over the next 2 weeks. But please notify your surgeon on follow up.     FACIAL MUSCLE WEAKNESS  ° The site of the surgery is adjacent to branches of the nerve that controls the muscles of your face. Due to this, nerves may be stretched due to the surgery or post-operative swelling, causing weakness of these muscles.   ° This will generally resolve over the course of weeks to months, and will be monitored during your follow up visits.     PLEASE REPORT ANY OF THE FOLLOWING TO OUR TEAM:  ° Sudden or excessive bleeding, swelling, or bruising.  ° Any itching, rash, or adverse reaction to medication.  ° Fever over 100 degrees Fahrenheit.  ° Drainage or discharge from the incision sites (other than blood).  ° Any injury to the face over the next 6 weeks.    If you have any questions or concerns please  contact us during regular office hours ([phone]).  For any emergency or after hours questions do not hesitate to contact the resident on call. You may call [phone] for the hospital  and ask for the ``Oral Surgeon On Call´´.

## 2024-03-29 NOTE — CARE PLAN
The patient's goals for the shift include      The clinical goals for the shift include pain control    Problem: Pain  Goal: My pain/discomfort is manageable  Outcome: Progressing     Problem: Safety  Goal: Patient will be injury free during hospitalization  Outcome: Progressing  Goal: I will remain free of falls  Outcome: Progressing     Problem: Daily Care  Goal: Daily care needs are met  Outcome: Progressing     Problem: Psychosocial Needs  Goal: Demonstrates ability to cope with hospitalization/illness  Outcome: Progressing  Goal: Collaborate with me, my family, and caregiver to identify my specific goals  Outcome: Progressing     Problem: Discharge Barriers  Goal: My discharge needs are met  Outcome: Progressing     Problem: Pain - Adult  Goal: Verbalizes/displays adequate comfort level or baseline comfort level  Outcome: Progressing     Problem: Safety - Adult  Goal: Free from fall injury  Outcome: Progressing     Problem: Discharge Planning  Goal: Discharge to home or other facility with appropriate resources  Outcome: Progressing     Problem: Chronic Conditions and Co-morbidities  Goal: Patient's chronic conditions and co-morbidity symptoms are monitored and maintained or improved  Outcome: Progressing     Problem: Pain  Goal: Takes deep breaths with improved pain control throughout the shift  Outcome: Progressing  Goal: Turns in bed with improved pain control throughout the shift  Outcome: Progressing  Goal: Walks with improved pain control throughout the shift  Outcome: Progressing  Goal: Performs ADL's with improved pain control throughout shift  Outcome: Progressing  Goal: Participates in PT with improved pain control throughout the shift  Outcome: Progressing  Goal: Free from opioid side effects throughout the shift  Outcome: Progressing  Goal: Free from acute confusion related to pain meds throughout the shift  Outcome: Progressing

## 2024-03-29 NOTE — SIGNIFICANT EVENT
Paola Black 28 y.o female HOD #1, POD # 0 S/P B/L TMJ replacement. Patient is not fully awake. She is complaining of pain 101/10, difficulty breathing and epigastric pain, Patient  didn't void, pass gas or have a bowel movement. Initiated PO     Exam:   HEENT: Moderate soft facial swelling CN VII temporal intact b/l, marginal mandibular can't be assessed due to pain, incisions hemostatic, sutures intact   Oral: occlusion stable and reproducible   GI: stomach soft and non tender   MSK: No lower extremity edema or pain     .      3/28/2024     6:45 PM 3/28/2024     7:00 PM 3/28/2024     7:15 PM 3/28/2024     7:30 PM 3/28/2024     7:45 PM 3/28/2024     8:00 PM 3/28/2024     8:26 PM   Vitals   Systolic 147 150 138 101 120 126 142   Diastolic 80 76 78 80 86 87 90   Heart Rate 90 88 92 73 99 89 87   Temp    36.5 °C (97.7 °F)  36 °C (96.8 °F) 36.4 °C (97.5 °F)   Resp 16 16 14 14 16 14 14        A/P:  Paola Black 28 y.o female with PMHx of IBS and class III obesity HOD #1, POD # 0 S/P B/L TMJ replacement.     F: Full liquid diet, bowel regimen PRN, Zofran PRN   A: scheduled Ibuprofen 600 mg, Oxy 5mg PRN mod pain, Oxy 10mg PRN severe pain   V: 100 ml/hr LR   OMFS: [facility] suction bedside, STAT CT facial bones, Bacitracin BID incisions   R: Continuous pulse ox, encourage incentive spirometry   I: Unasyn 3g q6h until discharge   I: decadron 8mg q8h 3 doses only   T: if Hgb < 7   E: SCDs and Lovenox subcutaneous  40 mg   T: Maintain PIV   H: N/A   GI: george removed post-op   Dispo home possible tomorrow     Pierre Hanna PGY 1

## 2024-03-29 NOTE — PROGRESS NOTES
03/29/24 1010   Discharge Planning   Living Arrangements Spouse/significant other   Support Systems Spouse/significant other   Assistance Needed Independent   Type of Residence Private residence   Number of Stairs to Enter Residence 3   Number of Stairs Within Residence 0   Who is requesting discharge planning? Patient   Home or Post Acute Services None   Patient expects to be discharged to: Home   Financial Resource Strain   How hard is it for you to pay for the very basics like food, housing, medical care, and heating? Not hard   Housing Stability   In the last 12 months, was there a time when you were not able to pay the mortgage or rent on time? N   In the last 12 months, how many places have you lived? 1   In the last 12 months, was there a time when you did not have a steady place to sleep or slept in a shelter (including now)? N   Transportation Needs   In the past 12 months, has lack of transportation kept you from medical appointments or from getting medications? no   In the past 12 months, has lack of transportation kept you from meetings, work, or from getting things needed for daily living? No     Patient is POD 1 TMJ replacement.  She is independent with her care and denies need for homecare.  ADOD: today   DISP: home

## 2024-03-29 NOTE — PROGRESS NOTES
03/29/24 1012   ACS Disability Status   Are you deaf or do you have serious difficulty hearing? N   Are you blind or do you have serious difficulty seeing, even when wearing glasses? N   Because of a physical, mental, or emotional condition, do you have serious difficulty concentrating, remembering, or making decisions? (5 years old or older) N   Do you have serious difficulty walking or climbing stairs? N   Do you have serious difficulty dressing or bathing? N   Because of a physical, mental, or emotional condition, do you have serious difficulty doing errands alone such as visiting the doctor? N

## 2024-04-10 LAB
LABORATORY COMMENT REPORT: NORMAL
PATH REPORT.FINAL DX SPEC: NORMAL
PATH REPORT.GROSS SPEC: NORMAL
PATH REPORT.RELEVANT HX SPEC: NORMAL
PATH REPORT.TOTAL CANCER: NORMAL

## 2024-04-30 DIAGNOSIS — M62.838 MUSCLE SPASM: Primary | ICD-10-CM

## 2024-04-30 RX ORDER — METHOCARBAMOL 500 MG/1
500 TABLET, FILM COATED ORAL 4 TIMES DAILY
Qty: 40 TABLET | Refills: 0 | Status: SHIPPED | OUTPATIENT
Start: 2024-04-30 | End: 2024-05-10

## 2024-05-01 ENCOUNTER — APPOINTMENT (OUTPATIENT)
Dept: CARDIOLOGY | Facility: HOSPITAL | Age: 29
End: 2024-05-01
Payer: COMMERCIAL

## 2024-05-01 ENCOUNTER — HOSPITAL ENCOUNTER (EMERGENCY)
Facility: HOSPITAL | Age: 29
Discharge: HOME | End: 2024-05-02
Attending: STUDENT IN AN ORGANIZED HEALTH CARE EDUCATION/TRAINING PROGRAM
Payer: COMMERCIAL

## 2024-05-01 ENCOUNTER — APPOINTMENT (OUTPATIENT)
Dept: RADIOLOGY | Facility: HOSPITAL | Age: 29
End: 2024-05-01
Payer: COMMERCIAL

## 2024-05-01 DIAGNOSIS — T78.40XA ALLERGIC REACTION, INITIAL ENCOUNTER: Primary | ICD-10-CM

## 2024-05-01 LAB
ANION GAP SERPL CALC-SCNC: 15 MMOL/L (ref 10–20)
B-HCG SERPL-ACNC: <2 MIU/ML
BASOPHILS # BLD MANUAL: 0 X10*3/UL (ref 0–0.1)
BASOPHILS NFR BLD MANUAL: 0 %
BUN SERPL-MCNC: 13 MG/DL (ref 6–23)
CALCIUM SERPL-MCNC: 9.3 MG/DL (ref 8.6–10.3)
CARDIAC TROPONIN I PNL SERPL HS: 3 NG/L (ref 0–13)
CHLORIDE SERPL-SCNC: 105 MMOL/L (ref 98–107)
CO2 SERPL-SCNC: 21 MMOL/L (ref 21–32)
CREAT SERPL-MCNC: 0.72 MG/DL (ref 0.5–1.05)
EGFRCR SERPLBLD CKD-EPI 2021: >90 ML/MIN/1.73M*2
EOSINOPHIL # BLD MANUAL: 0.25 X10*3/UL (ref 0–0.7)
EOSINOPHIL NFR BLD MANUAL: 2 %
ERYTHROCYTE [DISTWIDTH] IN BLOOD BY AUTOMATED COUNT: 14.4 % (ref 11.5–14.5)
FLUAV RNA RESP QL NAA+PROBE: NOT DETECTED
FLUBV RNA RESP QL NAA+PROBE: NOT DETECTED
GLUCOSE BLD MANUAL STRIP-MCNC: 79 MG/DL (ref 74–99)
GLUCOSE SERPL-MCNC: 85 MG/DL (ref 74–99)
HCT VFR BLD AUTO: 39.9 % (ref 36–46)
HGB BLD-MCNC: 12.6 G/DL (ref 12–16)
IMM GRANULOCYTES # BLD AUTO: 0.03 X10*3/UL (ref 0–0.7)
IMM GRANULOCYTES NFR BLD AUTO: 0.2 % (ref 0–0.9)
LYMPHOCYTES # BLD MANUAL: 4.54 X10*3/UL (ref 1.2–4.8)
LYMPHOCYTES NFR BLD MANUAL: 36 %
MAGNESIUM SERPL-MCNC: 2.14 MG/DL (ref 1.6–2.4)
MCH RBC QN AUTO: 27 PG (ref 26–34)
MCHC RBC AUTO-ENTMCNC: 31.6 G/DL (ref 32–36)
MCV RBC AUTO: 86 FL (ref 80–100)
MONOCYTES # BLD MANUAL: 0.38 X10*3/UL (ref 0.1–1)
MONOCYTES NFR BLD MANUAL: 3 %
NEUTS SEG # BLD MANUAL: 7.43 X10*3/UL (ref 1.2–7)
NEUTS SEG NFR BLD MANUAL: 59 %
NRBC BLD-RTO: 0 /100 WBCS (ref 0–0)
PLATELET # BLD AUTO: 372 X10*3/UL (ref 150–450)
POTASSIUM SERPL-SCNC: 3.4 MMOL/L (ref 3.5–5.3)
RBC # BLD AUTO: 4.66 X10*6/UL (ref 4–5.2)
RBC MORPH BLD: ABNORMAL
SARS-COV-2 RNA RESP QL NAA+PROBE: NOT DETECTED
SODIUM SERPL-SCNC: 138 MMOL/L (ref 136–145)
TOTAL CELLS COUNTED BLD: 100
WBC # BLD AUTO: 12.6 X10*3/UL (ref 4.4–11.3)

## 2024-05-01 PROCEDURE — 96374 THER/PROPH/DIAG INJ IV PUSH: CPT

## 2024-05-01 PROCEDURE — 84484 ASSAY OF TROPONIN QUANT: CPT | Performed by: STUDENT IN AN ORGANIZED HEALTH CARE EDUCATION/TRAINING PROGRAM

## 2024-05-01 PROCEDURE — 87636 SARSCOV2 & INF A&B AMP PRB: CPT | Performed by: STUDENT IN AN ORGANIZED HEALTH CARE EDUCATION/TRAINING PROGRAM

## 2024-05-01 PROCEDURE — 2500000004 HC RX 250 GENERAL PHARMACY W/ HCPCS (ALT 636 FOR OP/ED): Performed by: STUDENT IN AN ORGANIZED HEALTH CARE EDUCATION/TRAINING PROGRAM

## 2024-05-01 PROCEDURE — 85027 COMPLETE CBC AUTOMATED: CPT | Performed by: STUDENT IN AN ORGANIZED HEALTH CARE EDUCATION/TRAINING PROGRAM

## 2024-05-01 PROCEDURE — 82947 ASSAY GLUCOSE BLOOD QUANT: CPT | Mod: 59

## 2024-05-01 PROCEDURE — 99284 EMERGENCY DEPT VISIT MOD MDM: CPT | Mod: 25

## 2024-05-01 PROCEDURE — 71045 X-RAY EXAM CHEST 1 VIEW: CPT | Performed by: RADIOLOGY

## 2024-05-01 PROCEDURE — 71045 X-RAY EXAM CHEST 1 VIEW: CPT

## 2024-05-01 PROCEDURE — 84702 CHORIONIC GONADOTROPIN TEST: CPT | Performed by: STUDENT IN AN ORGANIZED HEALTH CARE EDUCATION/TRAINING PROGRAM

## 2024-05-01 PROCEDURE — 96375 TX/PRO/DX INJ NEW DRUG ADDON: CPT

## 2024-05-01 PROCEDURE — 82947 ASSAY GLUCOSE BLOOD QUANT: CPT

## 2024-05-01 PROCEDURE — 80048 BASIC METABOLIC PNL TOTAL CA: CPT | Performed by: STUDENT IN AN ORGANIZED HEALTH CARE EDUCATION/TRAINING PROGRAM

## 2024-05-01 PROCEDURE — 36415 COLL VENOUS BLD VENIPUNCTURE: CPT | Performed by: STUDENT IN AN ORGANIZED HEALTH CARE EDUCATION/TRAINING PROGRAM

## 2024-05-01 PROCEDURE — 2500000001 HC RX 250 WO HCPCS SELF ADMINISTERED DRUGS (ALT 637 FOR MEDICARE OP): Performed by: STUDENT IN AN ORGANIZED HEALTH CARE EDUCATION/TRAINING PROGRAM

## 2024-05-01 PROCEDURE — 93005 ELECTROCARDIOGRAM TRACING: CPT

## 2024-05-01 PROCEDURE — 85007 BL SMEAR W/DIFF WBC COUNT: CPT | Performed by: STUDENT IN AN ORGANIZED HEALTH CARE EDUCATION/TRAINING PROGRAM

## 2024-05-01 PROCEDURE — 83735 ASSAY OF MAGNESIUM: CPT | Performed by: STUDENT IN AN ORGANIZED HEALTH CARE EDUCATION/TRAINING PROGRAM

## 2024-05-01 RX ORDER — POTASSIUM CHLORIDE 1.5 G/1.58G
20 POWDER, FOR SOLUTION ORAL ONCE
Status: COMPLETED | OUTPATIENT
Start: 2024-05-01 | End: 2024-05-01

## 2024-05-01 RX ORDER — OXYCODONE HYDROCHLORIDE 5 MG/1
5 TABLET ORAL ONCE
Status: COMPLETED | OUTPATIENT
Start: 2024-05-01 | End: 2024-05-01

## 2024-05-01 RX ORDER — POTASSIUM CHLORIDE 20 MEQ/1
40 TABLET, EXTENDED RELEASE ORAL ONCE
Status: DISCONTINUED | OUTPATIENT
Start: 2024-05-01 | End: 2024-05-01

## 2024-05-01 RX ORDER — LORAZEPAM 2 MG/ML
1 INJECTION INTRAMUSCULAR ONCE
Status: DISCONTINUED | OUTPATIENT
Start: 2024-05-01 | End: 2024-05-01

## 2024-05-01 RX ORDER — ONDANSETRON HYDROCHLORIDE 2 MG/ML
4 INJECTION, SOLUTION INTRAVENOUS ONCE
Status: COMPLETED | OUTPATIENT
Start: 2024-05-01 | End: 2024-05-01

## 2024-05-01 RX ORDER — DIPHENHYDRAMINE HYDROCHLORIDE 50 MG/ML
50 INJECTION INTRAMUSCULAR; INTRAVENOUS ONCE
Status: COMPLETED | OUTPATIENT
Start: 2024-05-01 | End: 2024-05-01

## 2024-05-01 RX ORDER — PREDNISONE 50 MG/1
50 TABLET ORAL ONCE
Status: COMPLETED | OUTPATIENT
Start: 2024-05-01 | End: 2024-05-01

## 2024-05-01 RX ADMIN — OXYCODONE HYDROCHLORIDE 5 MG: 5 TABLET ORAL at 23:01

## 2024-05-01 RX ADMIN — SODIUM CHLORIDE 1000 ML: 9 INJECTION, SOLUTION INTRAVENOUS at 22:46

## 2024-05-01 RX ADMIN — DIPHENHYDRAMINE HYDROCHLORIDE 50 MG: 50 INJECTION, SOLUTION INTRAMUSCULAR; INTRAVENOUS at 22:32

## 2024-05-01 RX ADMIN — PREDNISONE 50 MG: 50 TABLET ORAL at 22:32

## 2024-05-01 RX ADMIN — ONDANSETRON 4 MG: 2 INJECTION INTRAMUSCULAR; INTRAVENOUS at 22:46

## 2024-05-01 RX ADMIN — POTASSIUM CHLORIDE 20 MEQ: 1.5 POWDER, FOR SOLUTION ORAL at 23:44

## 2024-05-01 ASSESSMENT — COLUMBIA-SUICIDE SEVERITY RATING SCALE - C-SSRS
2. HAVE YOU ACTUALLY HAD ANY THOUGHTS OF KILLING YOURSELF?: NO
1. IN THE PAST MONTH, HAVE YOU WISHED YOU WERE DEAD OR WISHED YOU COULD GO TO SLEEP AND NOT WAKE UP?: NO
6. HAVE YOU EVER DONE ANYTHING, STARTED TO DO ANYTHING, OR PREPARED TO DO ANYTHING TO END YOUR LIFE?: NO

## 2024-05-01 ASSESSMENT — PAIN - FUNCTIONAL ASSESSMENT
PAIN_FUNCTIONAL_ASSESSMENT: 0-10
PAIN_FUNCTIONAL_ASSESSMENT: 0-10

## 2024-05-01 ASSESSMENT — LIFESTYLE VARIABLES
HAVE YOU EVER FELT YOU SHOULD CUT DOWN ON YOUR DRINKING: NO
EVER FELT BAD OR GUILTY ABOUT YOUR DRINKING: NO
HAVE PEOPLE ANNOYED YOU BY CRITICIZING YOUR DRINKING: NO
TOTAL SCORE: 0
EVER HAD A DRINK FIRST THING IN THE MORNING TO STEADY YOUR NERVES TO GET RID OF A HANGOVER: NO

## 2024-05-01 ASSESSMENT — PAIN SCALES - GENERAL: PAINLEVEL_OUTOF10: 10 - WORST POSSIBLE PAIN

## 2024-05-01 ASSESSMENT — PAIN DESCRIPTION - LOCATION: LOCATION: JAW

## 2024-05-02 VITALS
WEIGHT: 282 LBS | RESPIRATION RATE: 15 BRPM | TEMPERATURE: 98.4 F | SYSTOLIC BLOOD PRESSURE: 123 MMHG | HEART RATE: 86 BPM | BODY MASS INDEX: 45.32 KG/M2 | DIASTOLIC BLOOD PRESSURE: 81 MMHG | HEIGHT: 66 IN | OXYGEN SATURATION: 100 %

## 2024-05-02 DIAGNOSIS — D50.9 IRON DEFICIENCY ANEMIA, UNSPECIFIED IRON DEFICIENCY ANEMIA TYPE: Primary | ICD-10-CM

## 2024-05-02 LAB — CARDIAC TROPONIN I PNL SERPL HS: <3 NG/L (ref 0–13)

## 2024-05-02 NOTE — ED PROVIDER NOTES
History/Exam limitations: none  HPI was provided by patient    HPI:    Chief Complaint   Patient presents with    Allergic Reaction     Pt took a new medication tonight Robaxin 500 mg to help relax her muscles. Pt started to have chest pain, SOB and just not feeling right.        Paola Black is a 29 y.o. female presents with chief complaint of of allergic reaction.  She states at 9 PM she took Robaxin and states she has been having chest tightness and some paresthesias.  She took it for her she had bilateral TMJ surgery.  Does have swelling to her cheeks but states she has had this since surgery and no increased symptoms.  No treatments given prior to arrival.  States she is just started on the medication because the Flexeril she was on she states she did not like how she felt.       ROS: All other review of systems are negative except as noted above and HPI or ROS.   CONSTITUTIONAL: fever, chills  ENT: sore throat, congestion, rhinorrhea  CARDIOVASCULAR: chest pain, palpitations, swelling  RESPIRATORY: cough, wheeze, shortness of breath  GI: nausea, vomiting, diarrhea, abdominal pain  GENITOURINARY: dysuria, hematuria, frequency  MUSCULOSKELETAL: deformity, neck pain  SKIN: rash, lesion  NEUROLOGIC: headache, numbness, focal weakness  NOTES: All systems reviewed, negative except as described above       Physical Exam:  GENERAL: Alert, oriented , cooperative,  in no acute distress.    HEAD: normocephalic, atraumatic    SKIN:  dry skin, no lesions.  No rash or hives or erythema    EYES: PERRL, EOMs intact,  Conjunctiva pink with no erythema or exudates. No scleral icterus.     ENT: No external deformities. Nares patent, mucus membranes moist.  Pharynx clear, uvula midline.  Handling secretions well, no oral swelling and airway is patent.    NECK: Supple, without meningismus. Trachea at midline. No lymphadenopathy.  No swelling    PULMONARY: Clear bilaterally. No crackles, rhonchi, wheezing.  No respiratory  distress.  No work of breathing.    CARDIAC: Regular rate and regular rhythm.  Pulses 2+ in radials and dorsal pedal pulses bilaterally.  No murmur, rub, gallop.  No edema.    ABDOMEN: Soft, nontender, active bowel sounds.  No palpable organomegaly.  No rebound or guarding.  No CVA tenderness.  No pulsatile masses.    MUSCULOSKELETAL: Full range of motion throughout, no deformity.     NEUROLOGICAL:  no focal neuro deficits.  Strength 5 out of 5 throughout bilateral upper and lower extremities neurovascular intact in bilateral upper and lower extremities    PSYCHIATRIC: Appropriate mood and affect. Calm.       MDM/ED COURSE:      The patient presented for evaluation of allergic reaction.    The patient was  observed here until there symptoms completely resolved.  Patient handling secretions well without any respiratory complaints.  When the patient was reevaluated at bedside there symptoms were completely resolved and was asymptomatic.  I spoke with them about strict return precautions to return here to the ER.  Patient feels safe for discharge home.  Patient nontoxic-appearing on reexamination.  Vital signs are stable.  The patient and caregiver are in agreement with the plan and given instructions to follow up with their PCP.       I discussed the differential, results and plan with the patient and/or family/friend/caregiver if present.       I emphasized the importance of follow-up with the physician I referred them to in the timeframe recommended.  I explained reasons for the patient to return to the Emergency Department. Additional verbal discharge instructions were also given and discussed with the patient to supplement those generated by the EMR. We also discussed medications that were prescribed (if any) including common side effects and interactions. The patient was advised to abstain from driving, operating heavy machinery or making significant decisions while taking medications such as opiates and muscle  relaxers that may impair this. All questions were addressed.  They understand return precautions and discharge instructions. The patient and/or family/friend/caregiver expressed understanding.     ED Course as of 05/02/24 0022   Wed May 01, 2024   2206 EKG interpreted by me shows normal sinus rhythm no STEMI.  Rate 88.  Compared to prior EKG similar findings present sinus rhythm has replaced sinus tachycardia [WL]   2314 She was given a dose of oxycodone as she states she is on this at home for pain-after she became nauseous here she had to her jaw. [WL]   2335 Lab work reassuring except for potassium low so was given p.o. replacement.  Was given Benadryl and prednisone here along with IV fluids [WL]   Thu May 02, 2024   0010 She may be suffering from allergic reaction possibly just side effects of the medication she took.  Advised her to follow-up with her primary care doctor or whoever prescribed it and for her not to take this anymore and to have it switched to something more appropriate.  She already has EpiPen's at home. [WL]   0021 On reevaluation patient is feeling better and ready to go home. [WL]      ED Course User Index  [WL] Maco Nayak, DO         Diagnoses as of 05/02/24 0022   Allergic reaction, initial encounter       Note: This note was dictated by speech recognition. Minor errors in transcription may be present.          Past Medical History:   Diagnosis Date    Anxiety disorder, unspecified 11/16/2020    Anxiety and depression    Body mass index (BMI) 38.0-38.9, adult 05/19/2020    BMI 38.0-38.9,adult    Cervicogenic headache 04/02/2018    Cervicogenic headache    Changes in skin texture 05/19/2020    Changes in skin texture    Chronic rhinitis 04/09/2017    Rhinitis    Disorder of the skin and subcutaneous tissue, unspecified 10/22/2018    Skin lesion    Dorsalgia, unspecified 11/19/2021    Back pain, acute    Elevated blood-pressure reading, without diagnosis of hypertension 04/14/2020     Elevated blood pressure reading    Encounter for follow-up examination after completed treatment for conditions other than malignant neoplasm 10/22/2018    Hospital discharge follow-up    Influenza due to other identified influenza virus with other respiratory manifestations 02/05/2020    Influenza A    Maternal care for other (suspected) fetal abnormality and damage, fetal cardiac anomalies, not applicable or unspecified ([facility]) 04/22/2021    Abnormal fetal echocardiogram affecting antepartum care of mother    Other conditions influencing health status 02/05/2020    History of cough    Other conditions influencing health status 01/23/2019    History of chronic diarrhea    Other malaise 12/18/2019    Malaise    Other specified abnormal findings of blood chemistry 04/22/2020    Abnormal CBC    Other specified disorders of teeth and supporting structures 08/28/2018    Pain, dental    Periapical abscess without sinus 08/28/2018    Dental infection    Personal history of other diseases of the digestive system 04/02/2018    History of gastritis    Personal history of other diseases of the digestive system 08/13/2019    History of acute gastritis    Personal history of other diseases of the female genital tract 04/22/2020    History of breast pain    Personal history of other diseases of the musculoskeletal system and connective tissue 04/02/2018    History of neck pain    Personal history of other diseases of the respiratory system 01/30/2019    History of acute bronchitis    Personal history of other endocrine, nutritional and metabolic disease 10/17/2019    History of morbid obesity    Personal history of other mental and behavioral disorders 04/22/2021    History of depression    Personal history of other mental and behavioral disorders 05/19/2020    History of anxiety    Personal history of other specified conditions 08/13/2019    History of fatigue    Personal history of other specified conditions 08/10/2020     History of epistaxis    Unspecified abdominal pain 08/13/2019    Abdominal pain, acute    Unspecified abdominal pain 08/13/2019    Flank pain, acute      Social History     Socioeconomic History    Marital status: Single     Spouse name: None    Number of children: None    Years of education: None    Highest education level: None   Occupational History    None   Tobacco Use    Smoking status: Never     Passive exposure: Never    Smokeless tobacco: Never   Vaping Use    Vaping status: Never Used   Substance and Sexual Activity    Alcohol use: Yes     Comment: RARE    Drug use: Never    Sexual activity: Defer   Other Topics Concern    None   Social History Narrative    None     Social Determinants of Health     Financial Resource Strain: Low Risk  (3/29/2024)    Overall Financial Resource Strain (CARDIA)     Difficulty of Paying Living Expenses: Not hard at all   Food Insecurity: Not on file   Transportation Needs: No Transportation Needs (3/29/2024)    PRAPARE - Transportation     Lack of Transportation (Medical): No     Lack of Transportation (Non-Medical): No   Physical Activity: Not on file   Stress: Not on file   Social Connections: Not on file   Intimate Partner Violence: Not on file   Housing Stability: Low Risk  (3/29/2024)    Housing Stability Vital Sign     Unable to Pay for Housing in the Last Year: No     Number of Places Lived in the Last Year: 1     Unstable Housing in the Last Year: No     Current Outpatient Medications   Medication Instructions    amitriptyline (ELAVIL) 10 mg, oral, Nightly    bacitracin 500 unit/gram ointment Apply topically 2 times a day for 7 days.    cyclobenzaprine (FLEXERIL) 10 mg, oral, Daily    EPINEPHrine 0.3 mg/0.3 mL injection syringe USE AS DIRECTED IN CASE OF A SEVERE ALLERGIC REACTION    ergocalciferol (Vitamin D-2) 1.25 MG (31942 UT) capsule 1 capsule, oral, Once Weekly    ferrous sulfate 325 (65 Fe) MG tablet 1 tablet, oral, Daily, Take with food.    linaCLOtide  (LINZESS) 72 mcg, oral, Daily    medroxyPROGESTERone 150 mg/mL injection syringe INJECT 1 (ONE) MILLILITER EVERY 90 DAYS    methocarbamol (ROBAXIN) 500 mg, oral, 4 times daily    multivitamin tablet 1 tablet, oral, Daily    omeprazole OTC (PriLOSEC OTC) 20 mg EC tablet TAKE 1 TABLET (20 MG) BY MOUTH ONCE DAILY. DO NOT CRUSH, CHEW, OR SPLIT. *PA SENT     Allergies   Allergen Reactions    Acetaminophen Shortness of breath, Itching, Swelling and Wheezing    Bee Venom Protein (Honey Bee) Anaphylaxis    Latex, Natural Rubber Swelling and Unknown    Mushroom Swelling             ED Triage Vitals [05/01/24 2144]   Temperature Heart Rate Respirations BP   37 °C (98.6 °F) 97 20 (!) 135/100      Pulse Ox Temp Source Heart Rate Source Patient Position   100 % Temporal Monitor Lying      BP Location FiO2 (%)     Right arm --               Labs and Imaging  XR chest 1 view   Final Result   No acute cardiopulmonary process.        MACRO:   None        Signed by: Araseli Arreguin 5/1/2024 11:36 PM   Dictation workstation:   [id number]        Labs Reviewed   CBC WITH AUTO DIFFERENTIAL - Abnormal       Result Value    WBC 12.6 (*)     nRBC 0.0      RBC 4.66      Hemoglobin 12.6      Hematocrit 39.9      MCV 86      MCH 27.0      MCHC 31.6 (*)     RDW 14.4      Platelets 372      Immature Granulocytes %, Automated 0.2      Immature Granulocytes Absolute, Automated 0.03      Narrative:     The previously reported component Neutrophils % is no longer being reported.  The previously reported component Lymphocytes % is no longer being reported.  The previously reported component Monocytes % is no longer being reported.  The previously   reported component Eosinophils % is no longer being reported.  The previously reported component Basophils % is no longer being reported.  The previously reported component Absolute Neutrophils is no longer being reported.  The previously reported   component Absolute Lymphocytes is no longer being  reported.  The previously reported component Absolute Monocytes is no longer being reported.  The previously reported component Absolute Eosinophils is no longer being reported.  The previously reported   component Absolute Basophils is no longer being reported.   BASIC METABOLIC PANEL - Abnormal    Glucose 85      Sodium 138      Potassium 3.4 (*)     Chloride 105      Bicarbonate 21      Anion Gap 15      Urea Nitrogen 13      Creatinine 0.72      eGFR >90      Calcium 9.3     MANUAL DIFFERENTIAL - Abnormal    Neutrophils %, Manual 59.0      Lymphocytes %, Manual 36.0      Monocytes %, Manual 3.0      Eosinophils %, Manual 2.0      Basophils %, Manual 0.0      Seg Neutrophils Absolute, Manual 7.43 (*)     Lymphocytes Absolute, Manual 4.54      Monocytes Absolute, Manual 0.38      Eosinophils Absolute, Manual 0.25      Basophils Absolute, Manual 0.00      Total Cells Counted 100      RBC Morphology No significant RBC morphology present     MAGNESIUM - Normal    Magnesium 2.14     SERIAL TROPONIN-INITIAL - Normal    Troponin I, High Sensitivity 3      Narrative:     Less than 99th percentile of normal range cutoff-  Female and children under 18 years old <14 ng/L; Male <21 ng/L: Negative  Repeat testing should be performed if clinically indicated.     Female and children under 18 years old 14-50 ng/L; Male 21-50 ng/L:  Consistent with possible cardiac damage and possible increased clinical   risk. Serial measurements may help to assess extent of myocardial damage.     >50 ng/L: Consistent with cardiac damage, increased clinical risk and  myocardial infarction. Serial measurements may help assess extent of   myocardial damage.      NOTE: Children less than 1 year old may have higher baseline troponin   levels and results should be interpreted in conjunction with the overall   clinical context.     NOTE: Troponin I testing is performed using a different   testing methodology at [facility] than at other    [facility]. Direct result comparisons should only   be made within the same method.   SERIAL TROPONIN, 1 HOUR - Normal    Troponin I, High Sensitivity <3      Narrative:     Less than 99th percentile of normal range cutoff-  Female and children under 18 years old <14 ng/L; Male <21 ng/L: Negative  Repeat testing should be performed if clinically indicated.     Female and children under 18 years old 14-50 ng/L; Male 21-50 ng/L:  Consistent with possible cardiac damage and possible increased clinical   risk. Serial measurements may help to assess extent of myocardial damage.     >50 ng/L: Consistent with cardiac damage, increased clinical risk and  myocardial infarction. Serial measurements may help assess extent of   myocardial damage.      NOTE: Children less than 1 year old may have higher baseline troponin   levels and results should be interpreted in conjunction with the overall   clinical context.     NOTE: Troponin I testing is performed using a different   testing methodology at [facility] than at other   [facility]. Direct result comparisons should only   be made within the same method.   SARS-COV-2 PCR - Normal    Coronavirus 2019, PCR Not Detected      Narrative:     This assay has received FDA Emergency Use Authorization (EUA) and is only authorized for the duration of time that circumstances exist to justify the authorization of the emergency use of in vitro diagnostic tests for the detection of SARS-CoV-2 virus and/or diagnosis of COVID-19 infection under section 564(b)(1) of the Act, 21 U.S.C. 360bbb-3(b)(1). This assay is an in vitro diagnostic nucleic acid amplification test for the qualitative detection of SARS-CoV-2 from nasopharyngeal specimens and has been validated for use at [facility]. Negative results do not preclude COVID-19 infections and should not be used as the sole basis for diagnosis, treatment, or other management decisions.      INFLUENZA A AND B PCR - Normal    Flu A Result Not Detected      Flu B Result Not Detected      Narrative:     This assay is an in vitro diagnostic multiplex nucleic acid amplification test for the detection and discrimination of Influenza A & B from nasopharyngeal specimens, and has been validated for use at [facility]. Negative results do not preclude Influenza A/B infections, and should not be used as the sole basis for diagnosis, treatment, or other management decisions. If Influenza A/B and RSV PCR results are negative, testing for Parainfluenza virus, Adenovirus and Metapneumovirus is routinely performed for [facility] pediatric oncology and intensive care inpatients, and is available on other patients by placing an add-on request.   HUMAN CHORIONIC GONADOTROPIN, SERUM QUANTITATIVE - Normal    HCG, Beta-Quantitative <2      Narrative:      Total HCG measurement is performed using the [employer] Access   Immunoassay which detects intact HCG and free beta HCG subunit.    This test is not indicated for use as a tumor marker.   HCG testing is performed using a different test methodology at [facility] than other [facility]. Direct result comparison   should only be made within the same method.       POCT GLUCOSE - Normal    POCT Glucose 79     TROPONIN SERIES- (INITIAL, 1 HR)    Narrative:     The following orders were created for panel order Troponin I Series, High Sensitivity (0, 1 HR).  Procedure                               Abnormality         Status                     ---------                               -----------         ------                     Troponin I, High Sensiti...[[id number]]  Normal              Final result               Troponin, High Sensitivi...[[id number]]  Normal              Final result                 Please view results for these tests on the individual orders.           Procedure  Procedures                  Maco Nayak DO  05/02/24  0022

## 2024-05-03 LAB
ATRIAL RATE: 88 BPM
P AXIS: 51 DEGREES
P OFFSET: 194 MS
P ONSET: 137 MS
PR INTERVAL: 158 MS
Q ONSET: 216 MS
QRS COUNT: 14 BEATS
QRS DURATION: 82 MS
QT INTERVAL: 366 MS
QTC CALCULATION(BAZETT): 442 MS
QTC FREDERICIA: 416 MS
R AXIS: 37 DEGREES
T AXIS: 27 DEGREES
T OFFSET: 399 MS
VENTRICULAR RATE: 88 BPM

## 2024-05-13 RX ORDER — FERROUS SULFATE 325(65) MG
1 TABLET ORAL DAILY
Qty: 30 TABLET | Refills: 3 | Status: SHIPPED | OUTPATIENT
Start: 2024-05-13

## 2024-06-11 NOTE — PROGRESS NOTES
"Subjective   Reason for Visit: Paola Black is an 29 y.o. female here for a CPE     Chief Complaint   Patient presents with    Annual Exam     Paola Black is a 29 y.o. female is here today for a CPE. Patient reports she needs a refill for her epi-pen and omeprazole for a 30 day rx.       HPI  Paola is a 30 yo female presenting today for Annual CPE     Dx: HERI, GERD, CIC      Health Maintenance Due   Topic Date Due    Varicella Vaccines (1 of 2 - 13+ 2-dose series) Never done    Hepatitis B Vaccines (3 of 3 - 19+ 3-dose series) 01/14/2021    COVID-19 Vaccine (1 - 2023-24 season) Never done    Cervical Cancer Screening  01/07/2024       Occupation:  Stay at home mom    LMP: 5/23/2024  PAP: 2021  GYN: Dr. Valenzuela   Fasting blood work: need FLP   Last eye exam: 2023  Last dental Exam: 2023  Exercise: not regularly     Pt is followed by GI, for CIC and GERD  Rx Linzess and Omeprazole     Pt was in ED last month for allergic reaction to Robaxin   States her chest was tight, SOB  No rash    Pt is due for FLP  Stopped taking Vitamin D and Iron--> we discussed resuming to maintain adequate levels     Allergies   Allergen Reactions    Acetaminophen Shortness of breath, Itching, Swelling and Wheezing    Bee Venom Protein (Honey Bee) Anaphylaxis    Robaxin [Methocarbamol] Shortness of breath    Latex, Natural Rubber Swelling and Unknown    Mushroom Swelling         Patient Care Team:  GUNNER Brown as PCP - General (Family Medicine)  GUNNER Hollins as PCP - [facility] PCP  GUNNER Hollins as PCP - [facility] Medicaid PCP     Review of Systems  ROS was completed and all systems are negative with the exception of what was noted in the the HPI.       Objective   Vitals:  /72   Pulse 88   Ht 1.676 m (5' 6\")   Wt 127 kg (280 lb 12.8 oz)   SpO2 98%   BMI 45.32 kg/m²       Current Outpatient Medications   Medication Instructions    cyclobenzaprine (FLEXERIL) 10 mg, oral, Daily    " EPINEPHrine 0.3 mg/0.3 mL injection syringe USE AS DIRECTED IN CASE OF A SEVERE ALLERGIC REACTION    ferrous sulfate, 325 mg ferrous sulfate, tablet 1 tablet, oral, Daily, Take with food.    gabapentin (NEURONTIN) 100 mg, oral, Nightly    linaCLOtide (LINZESS) 72 mcg, oral, Daily    medroxyPROGESTERone 150 mg/mL injection syringe INJECT 1 (ONE) MILLILITER EVERY 90 DAYS    omeprazole OTC (PRILOSEC OTC) 20 mg, oral, Daily before breakfast, Do not crush, chew, or split.       Physical Exam  Vitals reviewed.   Cardiovascular:      Pulses: Normal pulses.      Heart sounds: Normal heart sounds.   Pulmonary:      Effort: Pulmonary effort is normal.      Breath sounds: Normal breath sounds.   Abdominal:      General: Bowel sounds are normal.   Skin:     General: Skin is warm and dry.   Neurological:      Mental Status: She is alert and oriented to person, place, and time.   Psychiatric:         Behavior: Behavior normal.         Assessment/Plan   Problem List Items Addressed This Visit             ICD-10-CM    Vitamin D deficiency E55.9    Relevant Orders    Vitamin D 25-Hydroxy,Total (for eval of Vitamin D levels)    Iron deficiency anemia D50.9    Relevant Medications    ferrous sulfate, 325 mg ferrous sulfate, tablet    Bee sting allergy Z91.030    Relevant Medications    EPINEPHrine 0.3 mg/0.3 mL injection syringe    Gastroesophageal reflux disease without esophagitis K21.9     Rx Omeprazole 20 mg   Sx controlled          Relevant Medications    omeprazole OTC (PriLOSEC OTC) 20 mg EC tablet    Annual physical exam - Primary Z00.00     - Counseled on healthy diet and regular exercise  - Fall avoidance information provided  - Personalized prevention plan provided   - PAP UTD  - Vaccines UTD             Other Visit Diagnoses         Codes    Screening for lipid disorders     Z13.220    Relevant Orders    Lipid Panel

## 2024-06-12 ENCOUNTER — LAB (OUTPATIENT)
Dept: LAB | Facility: LAB | Age: 29
End: 2024-06-12
Payer: COMMERCIAL

## 2024-06-12 ENCOUNTER — APPOINTMENT (OUTPATIENT)
Dept: PRIMARY CARE | Facility: CLINIC | Age: 29
End: 2024-06-12
Payer: COMMERCIAL

## 2024-06-12 VITALS
WEIGHT: 280.8 LBS | BODY MASS INDEX: 45.13 KG/M2 | HEART RATE: 88 BPM | DIASTOLIC BLOOD PRESSURE: 72 MMHG | SYSTOLIC BLOOD PRESSURE: 103 MMHG | HEIGHT: 66 IN | OXYGEN SATURATION: 98 %

## 2024-06-12 DIAGNOSIS — Z00.00 ANNUAL PHYSICAL EXAM: Primary | ICD-10-CM

## 2024-06-12 DIAGNOSIS — K21.9 GASTROESOPHAGEAL REFLUX DISEASE WITHOUT ESOPHAGITIS: ICD-10-CM

## 2024-06-12 DIAGNOSIS — Z13.220 SCREENING FOR LIPID DISORDERS: ICD-10-CM

## 2024-06-12 DIAGNOSIS — D50.9 IRON DEFICIENCY ANEMIA, UNSPECIFIED IRON DEFICIENCY ANEMIA TYPE: ICD-10-CM

## 2024-06-12 DIAGNOSIS — Z91.030 BEE STING ALLERGY: ICD-10-CM

## 2024-06-12 DIAGNOSIS — E55.9 VITAMIN D DEFICIENCY: ICD-10-CM

## 2024-06-12 LAB
25(OH)D3 SERPL-MCNC: 29 NG/ML (ref 30–100)
CHOLEST SERPL-MCNC: 135 MG/DL (ref 0–199)
CHOLESTEROL/HDL RATIO: 2.7
HDLC SERPL-MCNC: 49.8 MG/DL
LDLC SERPL CALC-MCNC: 76 MG/DL
NON HDL CHOLESTEROL: 85 MG/DL (ref 0–149)
TRIGL SERPL-MCNC: 45 MG/DL (ref 0–149)
VLDL: 9 MG/DL (ref 0–40)

## 2024-06-12 PROCEDURE — 99395 PREV VISIT EST AGE 18-39: CPT | Performed by: NURSE PRACTITIONER

## 2024-06-12 PROCEDURE — 82306 VITAMIN D 25 HYDROXY: CPT

## 2024-06-12 PROCEDURE — 80061 LIPID PANEL: CPT

## 2024-06-12 PROCEDURE — 36415 COLL VENOUS BLD VENIPUNCTURE: CPT

## 2024-06-12 PROCEDURE — 1036F TOBACCO NON-USER: CPT | Performed by: NURSE PRACTITIONER

## 2024-06-12 RX ORDER — GABAPENTIN 100 MG/1
100 CAPSULE ORAL NIGHTLY
COMMUNITY
Start: 2024-05-23

## 2024-06-12 RX ORDER — OMEPRAZOLE 20 MG/1
20 TABLET, DELAYED RELEASE ORAL
Qty: 30 TABLET | Refills: 11 | Status: SHIPPED | OUTPATIENT
Start: 2024-06-12 | End: 2024-06-14 | Stop reason: WASHOUT

## 2024-06-12 RX ORDER — EPINEPHRINE 0.3 MG/.3ML
INJECTION SUBCUTANEOUS
Qty: 0.3 ML | Refills: 1 | Status: SHIPPED | OUTPATIENT
Start: 2024-06-12

## 2024-06-12 RX ORDER — FERROUS SULFATE 325(65) MG
1 TABLET ORAL DAILY
Qty: 30 TABLET | Refills: 11 | Status: SHIPPED | OUTPATIENT
Start: 2024-06-12

## 2024-06-12 NOTE — PATIENT INSTRUCTIONS
Thank you for seeing me today.  It was a pleasure to see you again!    Today we did your Annual Physical Exam and discussed the following:     Continue all medications     For assistance with scheduling referrals or consultations, please call [phone] or [phone].    For laboratory, radiology, and other tests, please call [phone] ([phone] for pediatrics).   If you do not get results within 7-10 days, or you have any questions or concerns, please send a message, call the office ([phone]), or return to the office for a follow-up appointment.     For acute/sick visits, if you are unable to get an office visit, you can do a  On Demand Virtual Visit that is accessible via your My Chart account.  For emergencies, call 9-1-1 or go to the nearest Emergency Department.     Please schedule additional appointment(s) to address concern(s) not addressed today.    Please review prescription inserts and published information for possible adverse effects of all medications.     In general, results are discussed over the phone or via  [facility].     You can see your health information, review clinical summaries from office visits & test results online when you follow your health with MY  Chart, a personal health record.   To sign up go to [email or web address].   If you need assistance with signing up or trouble getting into your account call [facility] Patient Line 24/7 at [phone]     [facility] ANNUALLY AND AS NEEDED

## 2024-06-12 NOTE — ASSESSMENT & PLAN NOTE
- Counseled on healthy diet and regular exercise  - Fall avoidance information provided  - Personalized prevention plan provided   - PAP UTD  - Vaccines UTD

## 2024-06-13 NOTE — RESULT ENCOUNTER NOTE
Paola Black    I have reviewed all of your blood work and your Vitamin D is still too low. Make sure you are taking Vitamin D3 2000 every day.      If you have any questions, please feel free to call my office.    Take Care,   [place]

## 2024-06-14 ENCOUNTER — TELEPHONE (OUTPATIENT)
Dept: PRIMARY CARE | Facility: CLINIC | Age: 29
End: 2024-06-14
Payer: COMMERCIAL

## 2024-06-14 DIAGNOSIS — K21.9 GASTROESOPHAGEAL REFLUX DISEASE WITHOUT ESOPHAGITIS: Primary | ICD-10-CM

## 2024-06-14 RX ORDER — OMEPRAZOLE 20 MG/1
20 CAPSULE, DELAYED RELEASE ORAL DAILY
Qty: 90 CAPSULE | Refills: 3 | Status: SHIPPED | OUTPATIENT
Start: 2024-06-14 | End: 2025-06-09

## 2024-06-14 NOTE — TELEPHONE ENCOUNTER
Please send in the omeprazole CAPSULES not tablets and needs to be a 90 day supply or the insurance does not cover it

## 2024-08-20 DIAGNOSIS — K59.04 CHRONIC IDIOPATHIC CONSTIPATION: ICD-10-CM

## 2024-09-03 ENCOUNTER — HOSPITAL ENCOUNTER (EMERGENCY)
Facility: HOSPITAL | Age: 29
Discharge: HOME | End: 2024-09-03
Attending: EMERGENCY MEDICINE
Payer: MEDICARE

## 2024-09-03 ENCOUNTER — PHARMACY VISIT (OUTPATIENT)
Dept: PHARMACY | Facility: CLINIC | Age: 29
End: 2024-09-03
Payer: MEDICAID

## 2024-09-03 ENCOUNTER — APPOINTMENT (OUTPATIENT)
Dept: RADIOLOGY | Facility: HOSPITAL | Age: 29
End: 2024-09-03
Payer: MEDICARE

## 2024-09-03 VITALS
BODY MASS INDEX: 46.32 KG/M2 | OXYGEN SATURATION: 98 % | SYSTOLIC BLOOD PRESSURE: 130 MMHG | TEMPERATURE: 97.9 F | RESPIRATION RATE: 18 BRPM | HEART RATE: 90 BPM | WEIGHT: 287 LBS | DIASTOLIC BLOOD PRESSURE: 24 MMHG

## 2024-09-03 DIAGNOSIS — T14.8XXA MUSCLE STRAIN: ICD-10-CM

## 2024-09-03 DIAGNOSIS — S70.12XA CONTUSION OF LEFT THIGH, INITIAL ENCOUNTER: ICD-10-CM
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
## 2024-09-03 DIAGNOSIS — S63.502A WRIST SPRAIN, LEFT, INITIAL ENCOUNTER: ICD-10-CM

## 2024-09-03 PROCEDURE — 70450 CT HEAD/BRAIN W/O DYE: CPT

## 2024-09-03 PROCEDURE — 71045 X-RAY EXAM CHEST 1 VIEW: CPT

## 2024-09-03 PROCEDURE — RXMED WILLOW AMBULATORY MEDICATION CHARGE

## 2024-09-03 PROCEDURE — 73552 X-RAY EXAM OF FEMUR 2/>: CPT | Mod: LT

## 2024-09-03 PROCEDURE — 73110 X-RAY EXAM OF WRIST: CPT | Mod: LT

## 2024-09-03 PROCEDURE — 71045 X-RAY EXAM CHEST 1 VIEW: CPT | Mod: FOREIGN READ | Performed by: RADIOLOGY

## 2024-09-03 PROCEDURE — 72170 X-RAY EXAM OF PELVIS: CPT | Mod: FOREIGN READ | Performed by: RADIOLOGY

## 2024-09-03 PROCEDURE — 73552 X-RAY EXAM OF FEMUR 2/>: CPT | Mod: LEFT SIDE | Performed by: RADIOLOGY

## 2024-09-03 PROCEDURE — 73110 X-RAY EXAM OF WRIST: CPT | Mod: LEFT SIDE | Performed by: SPECIALIST

## 2024-09-03 PROCEDURE — 72125 CT NECK SPINE W/O DYE: CPT | Performed by: RADIOLOGY

## 2024-09-03 PROCEDURE — 72170 X-RAY EXAM OF PELVIS: CPT

## 2024-09-03 PROCEDURE — 72125 CT NECK SPINE W/O DYE: CPT

## 2024-09-03 PROCEDURE — 70450 CT HEAD/BRAIN W/O DYE: CPT | Performed by: RADIOLOGY

## 2024-09-03 PROCEDURE — 99285 EMERGENCY DEPT VISIT HI MDM: CPT | Mod: 25

## 2024-09-03 RX ORDER — CYCLOBENZAPRINE HCL 10 MG
10 TABLET ORAL 2 TIMES DAILY PRN
Qty: 10 TABLET | Refills: 0 | Status: SHIPPED | OUTPATIENT
Start: 2024-09-03 | End: 2024-09-08

## 2024-09-03 RX ORDER — CYCLOBENZAPRINE HCL 10 MG
10 TABLET ORAL 2 TIMES DAILY PRN
Qty: 10 TABLET | Refills: 0 | Status: SHIPPED | OUTPATIENT
Start: 2024-09-03 | End: 2024-09-03

## 2024-09-03 ASSESSMENT — PAIN SCALES - GENERAL
PAINLEVEL_OUTOF10: 0 - NO PAIN
PAINLEVEL_OUTOF10: 0 - NO PAIN

## 2024-09-03 ASSESSMENT — LIFESTYLE VARIABLES
EVER FELT BAD OR GUILTY ABOUT YOUR DRINKING: NO
HAVE YOU EVER FELT YOU SHOULD CUT DOWN ON YOUR DRINKING: NO
HAVE PEOPLE ANNOYED YOU BY CRITICIZING YOUR DRINKING: NO
EVER HAD A DRINK FIRST THING IN THE MORNING TO STEADY YOUR NERVES TO GET RID OF A HANGOVER: NO
TOTAL SCORE: 0

## 2024-09-03 ASSESSMENT — PAIN - FUNCTIONAL ASSESSMENT: PAIN_FUNCTIONAL_ASSESSMENT: 0-10
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
## 2024-09-07 ENCOUNTER — APPOINTMENT (OUTPATIENT)
Dept: RADIOLOGY | Facility: HOSPITAL | Age: 29
End: 2024-09-07
Payer: COMMERCIAL

## 2024-09-07 ENCOUNTER — HOSPITAL ENCOUNTER (EMERGENCY)
Facility: HOSPITAL | Age: 29
Discharge: HOME | End: 2024-09-07
Payer: COMMERCIAL

## 2024-09-07 VITALS
SYSTOLIC BLOOD PRESSURE: 130 MMHG | TEMPERATURE: 97.9 F | WEIGHT: 287 LBS | HEART RATE: 90 BPM | BODY MASS INDEX: 46.12 KG/M2 | HEIGHT: 66 IN | DIASTOLIC BLOOD PRESSURE: 72 MMHG | OXYGEN SATURATION: 99 % | RESPIRATION RATE: 16 BRPM

## 2024-09-07 DIAGNOSIS — M51.26 HERNIATION OF INTERVERTEBRAL DISC OF LUMBAR REGION: Primary | ICD-10-CM

## 2024-09-07 LAB
APPEARANCE UR: CLEAR
BILIRUB UR STRIP.AUTO-MCNC: NEGATIVE MG/DL
COLOR UR: ABNORMAL
GLUCOSE UR STRIP.AUTO-MCNC: NORMAL MG/DL
HCG UR QL IA.RAPID: NEGATIVE
HOLD SPECIMEN: NORMAL
KETONES UR STRIP.AUTO-MCNC: NEGATIVE MG/DL
LEUKOCYTE ESTERASE UR QL STRIP.AUTO: ABNORMAL
NITRITE UR QL STRIP.AUTO: NEGATIVE
PH UR STRIP.AUTO: 7.5 [PH]
PROT UR STRIP.AUTO-MCNC: NEGATIVE MG/DL
RBC # UR STRIP.AUTO: NEGATIVE /UL
RBC #/AREA URNS AUTO: ABNORMAL /HPF
SP GR UR STRIP.AUTO: 1.02
SQUAMOUS #/AREA URNS AUTO: ABNORMAL /HPF
UROBILINOGEN UR STRIP.AUTO-MCNC: NORMAL MG/DL
WBC #/AREA URNS AUTO: ABNORMAL /HPF

## 2024-09-07 PROCEDURE — 2500000004 HC RX 250 GENERAL PHARMACY W/ HCPCS (ALT 636 FOR OP/ED)

## 2024-09-07 PROCEDURE — 96374 THER/PROPH/DIAG INJ IV PUSH: CPT

## 2024-09-07 PROCEDURE — 87086 URINE CULTURE/COLONY COUNT: CPT | Mod: GEALAB | Performed by: PHYSICIAN ASSISTANT

## 2024-09-07 PROCEDURE — 2500000004 HC RX 250 GENERAL PHARMACY W/ HCPCS (ALT 636 FOR OP/ED): Performed by: PHYSICIAN ASSISTANT

## 2024-09-07 PROCEDURE — 72131 CT LUMBAR SPINE W/O DYE: CPT | Mod: FOREIGN READ | Performed by: RADIOLOGY

## 2024-09-07 PROCEDURE — 81025 URINE PREGNANCY TEST: CPT | Performed by: PHYSICIAN ASSISTANT

## 2024-09-07 PROCEDURE — 72131 CT LUMBAR SPINE W/O DYE: CPT

## 2024-09-07 PROCEDURE — 36415 COLL VENOUS BLD VENIPUNCTURE: CPT | Performed by: PHYSICIAN ASSISTANT

## 2024-09-07 PROCEDURE — 99284 EMERGENCY DEPT VISIT MOD MDM: CPT | Mod: 25

## 2024-09-07 PROCEDURE — 81001 URINALYSIS AUTO W/SCOPE: CPT | Performed by: PHYSICIAN ASSISTANT

## 2024-09-07 PROCEDURE — 96375 TX/PRO/DX INJ NEW DRUG ADDON: CPT

## 2024-09-07 RX ORDER — MORPHINE SULFATE 4 MG/ML
4 INJECTION INTRAVENOUS ONCE
Status: COMPLETED | OUTPATIENT
Start: 2024-09-07 | End: 2024-09-07

## 2024-09-07 RX ORDER — PREDNISONE 20 MG/1
TABLET ORAL
Qty: 18 TABLET | Refills: 0 | Status: SHIPPED | OUTPATIENT
Start: 2024-09-07

## 2024-09-07 RX ORDER — KETOROLAC TROMETHAMINE 30 MG/ML
INJECTION, SOLUTION INTRAMUSCULAR; INTRAVENOUS
Status: COMPLETED
Start: 2024-09-07 | End: 2024-09-07

## 2024-09-07 RX ORDER — KETOROLAC TROMETHAMINE 30 MG/ML
30 INJECTION, SOLUTION INTRAMUSCULAR; INTRAVENOUS ONCE
Status: COMPLETED | OUTPATIENT
Start: 2024-09-07 | End: 2024-09-07

## 2024-09-07 RX ORDER — ONDANSETRON HYDROCHLORIDE 2 MG/ML
4 INJECTION, SOLUTION INTRAVENOUS ONCE
Status: COMPLETED | OUTPATIENT
Start: 2024-09-07 | End: 2024-09-07

## 2024-09-07 RX ORDER — GABAPENTIN 300 MG/1
300 CAPSULE ORAL NIGHTLY
Qty: 14 CAPSULE | Refills: 0 | Status: SHIPPED | OUTPATIENT
Start: 2024-09-07 | End: 2024-09-21

## 2024-09-07 RX ORDER — DEXAMETHASONE SODIUM PHOSPHATE 10 MG/ML
10 INJECTION INTRAMUSCULAR; INTRAVENOUS ONCE
Status: COMPLETED | OUTPATIENT
Start: 2024-09-07 | End: 2024-09-07

## 2024-09-07 RX ADMIN — DEXAMETHASONE SODIUM PHOSPHATE 10 MG: 10 INJECTION, SOLUTION INTRAMUSCULAR; INTRAVENOUS at 09:24

## 2024-09-07 RX ADMIN — ONDANSETRON 4 MG: 2 INJECTION, SOLUTION INTRAMUSCULAR; INTRAVENOUS at 10:57

## 2024-09-07 RX ADMIN — KETOROLAC TROMETHAMINE 30 MG: 30 INJECTION, SOLUTION INTRAMUSCULAR; INTRAVENOUS at 09:25

## 2024-09-07 RX ADMIN — MORPHINE SULFATE 4 MG: 4 INJECTION INTRAVENOUS at 10:57

## 2024-09-07 ASSESSMENT — PAIN SCALES - GENERAL
PAINLEVEL_OUTOF10: 10 - WORST POSSIBLE PAIN

## 2024-09-07 ASSESSMENT — PAIN - FUNCTIONAL ASSESSMENT: PAIN_FUNCTIONAL_ASSESSMENT: 0-10

## 2024-09-07 ASSESSMENT — PAIN DESCRIPTION - FREQUENCY: FREQUENCY: CONSTANT/CONTINUOUS

## 2024-09-07 ASSESSMENT — LIFESTYLE VARIABLES
HAVE PEOPLE ANNOYED YOU BY CRITICIZING YOUR DRINKING: NO
HAVE YOU EVER FELT YOU SHOULD CUT DOWN ON YOUR DRINKING: NO
TOTAL SCORE: 0
EVER FELT BAD OR GUILTY ABOUT YOUR DRINKING: NO
EVER HAD A DRINK FIRST THING IN THE MORNING TO STEADY YOUR NERVES TO GET RID OF A HANGOVER: NO

## 2024-09-07 ASSESSMENT — PAIN DESCRIPTION - PAIN TYPE: TYPE: ACUTE PAIN

## 2024-09-07 ASSESSMENT — PAIN DESCRIPTION - LOCATION: LOCATION: BACK

## 2024-09-07 ASSESSMENT — PAIN DESCRIPTION - ORIENTATION: ORIENTATION: LOWER

## 2024-09-07 ASSESSMENT — PAIN DESCRIPTION - DESCRIPTORS: DESCRIPTORS: ACHING;SHOOTING
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
## 2024-09-08 LAB — BACTERIA UR CULT: NORMAL

## 2024-09-09 RX ORDER — LINACLOTIDE 72 UG/1
CAPSULE, GELATIN COATED ORAL
Qty: 30 CAPSULE | Refills: 2 | Status: SHIPPED | OUTPATIENT
Start: 2024-09-09

## 2024-09-18 ENCOUNTER — TELEPHONE (OUTPATIENT)
Dept: PRIMARY CARE | Facility: CLINIC | Age: 29
End: 2024-09-18
Payer: COMMERCIAL
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
## 2024-09-25 ENCOUNTER — APPOINTMENT (OUTPATIENT)
Dept: PRIMARY CARE | Facility: CLINIC | Age: 29
End: 2024-09-25
Payer: COMMERCIAL

## 2024-09-25 VITALS
BODY MASS INDEX: 45.45 KG/M2 | SYSTOLIC BLOOD PRESSURE: 112 MMHG | HEART RATE: 87 BPM | OXYGEN SATURATION: 98 % | WEIGHT: 282.8 LBS | HEIGHT: 66 IN | DIASTOLIC BLOOD PRESSURE: 78 MMHG

## 2024-09-25 DIAGNOSIS — S33.131S: Primary | ICD-10-CM

## 2024-09-25 PROCEDURE — 3008F BODY MASS INDEX DOCD: CPT | Performed by: NURSE PRACTITIONER

## 2024-09-25 PROCEDURE — 1036F TOBACCO NON-USER: CPT | Performed by: NURSE PRACTITIONER

## 2024-09-25 PROCEDURE — 99212 OFFICE O/P EST SF 10 MIN: CPT | Performed by: NURSE PRACTITIONER

## 2024-09-25 NOTE — PATIENT INSTRUCTIONS
Thank you for seeing me today Paolamichelle Black, it was a pleasure to see you again!    Schedule MRI of your back then see ortho specialist     For assistance with scheduling referrals or consultations, please call [phone] or [phone].    For laboratory, radiology, and other tests, please call [phone] ([phone] for pediatrics).   If you do not get results within 7-10 days, or you have any questions or concerns, please send a message, call the office ([phone]), or return to the office for a follow-up appointment.     For acute/sick visits, if you are unable to get an office visit, you can do a  On Demand Virtual Visit that is accessible via your My Chart account.  For emergencies, call 9-1-1 or go to the nearest Emergency Department.     Please schedule additional appointment(s) to address concern(s) not addressed today.    Please review prescription inserts and published information for possible adverse effects of all medications.     In general, results are discussed over the phone or via  [facility].     You can see your health information, review clinical summaries from office visits & test results online when you follow your health with MY  Chart, a personal health record.   To sign up go to [email or web address].   If you need assistance with signing up or trouble getting into your account call [facility] Patient Line 24/7 at [phone]     RTC AS NEEDED     Take Care,   Shiloh Franco

## 2024-10-10 ENCOUNTER — APPOINTMENT (OUTPATIENT)
Dept: RADIOLOGY | Facility: HOSPITAL | Age: 29
End: 2024-10-10
Payer: COMMERCIAL

## 2024-10-14 ENCOUNTER — APPOINTMENT (OUTPATIENT)
Dept: ORTHOPEDIC SURGERY | Facility: CLINIC | Age: 29
End: 2024-10-14
Payer: COMMERCIAL

## 2024-10-24 ENCOUNTER — TRANSCRIBE ORDERS (OUTPATIENT)
Dept: ORTHOPEDIC SURGERY | Facility: HOSPITAL | Age: 29
End: 2024-10-24
Payer: COMMERCIAL

## 2024-10-24 DIAGNOSIS — M54.50 LOW BACK PAIN, UNSPECIFIED BACK PAIN LATERALITY, UNSPECIFIED CHRONICITY, UNSPECIFIED WHETHER SCIATICA PRESENT: ICD-10-CM

## 2024-10-29 ENCOUNTER — APPOINTMENT (OUTPATIENT)
Dept: DERMATOLOGY | Facility: CLINIC | Age: 29
End: 2024-10-29
Payer: COMMERCIAL

## 2024-10-29 ENCOUNTER — HOSPITAL ENCOUNTER (OUTPATIENT)
Dept: RADIOLOGY | Facility: CLINIC | Age: 29
Discharge: HOME | End: 2024-10-29
Payer: COMMERCIAL

## 2024-10-29 ENCOUNTER — OFFICE VISIT (OUTPATIENT)
Dept: ORTHOPEDIC SURGERY | Facility: CLINIC | Age: 29
End: 2024-10-29
Payer: COMMERCIAL

## 2024-10-29 VITALS — HEIGHT: 66 IN | BODY MASS INDEX: 45 KG/M2 | WEIGHT: 280 LBS

## 2024-10-29 DIAGNOSIS — R21 RASH AND OTHER NONSPECIFIC SKIN ERUPTION: Primary | ICD-10-CM

## 2024-10-29 DIAGNOSIS — L85.3 XEROSIS CUTIS: ICD-10-CM

## 2024-10-29 DIAGNOSIS — L21.9 SEBORRHEIC DERMATITIS: ICD-10-CM

## 2024-10-29 DIAGNOSIS — S39.012A ACUTE MYOFASCIAL STRAIN OF LUMBAR REGION, INITIAL ENCOUNTER: ICD-10-CM

## 2024-10-29 DIAGNOSIS — M54.50 LOW BACK PAIN, UNSPECIFIED BACK PAIN LATERALITY, UNSPECIFIED CHRONICITY, UNSPECIFIED WHETHER SCIATICA PRESENT: ICD-10-CM

## 2024-10-29 DIAGNOSIS — M54.16 LUMBAR RADICULOPATHY: Primary | ICD-10-CM

## 2024-10-29 PROCEDURE — 72110 X-RAY EXAM L-2 SPINE 4/>VWS: CPT

## 2024-10-29 PROCEDURE — 99203 OFFICE O/P NEW LOW 30 MIN: CPT | Performed by: ORTHOPAEDIC SURGERY

## 2024-10-29 PROCEDURE — 11104 PUNCH BX SKIN SINGLE LESION: CPT | Performed by: DERMATOLOGY

## 2024-10-29 PROCEDURE — 72110 X-RAY EXAM L-2 SPINE 4/>VWS: CPT | Performed by: RADIOLOGY

## 2024-10-29 PROCEDURE — 3008F BODY MASS INDEX DOCD: CPT | Performed by: ORTHOPAEDIC SURGERY

## 2024-10-29 PROCEDURE — 99213 OFFICE O/P EST LOW 20 MIN: CPT | Performed by: ORTHOPAEDIC SURGERY

## 2024-10-29 PROCEDURE — 99204 OFFICE O/P NEW MOD 45 MIN: CPT | Performed by: DERMATOLOGY

## 2024-10-29 RX ORDER — KETOCONAZOLE 20 MG/ML
SHAMPOO, SUSPENSION TOPICAL
Qty: 120 ML | Refills: 11 | Status: SHIPPED | OUTPATIENT
Start: 2024-10-29

## 2024-10-29 ASSESSMENT — PATIENT GLOBAL ASSESSMENT (PGA): PATIENT GLOBAL ASSESSMENT: PATIENT GLOBAL ASSESSMENT:  1 - CLEAR

## 2024-10-29 ASSESSMENT — DERMATOLOGY PATIENT ASSESSMENT
ARE YOU TRYING TO GET PREGNANT: NO
DO YOU HAVE ANY NEW OR CHANGING LESIONS: YES
ARE YOU AN ORGAN TRANSPLANT RECIPIENT: NO
DO YOU HAVE IRREGULAR MENSTRUAL CYCLES: NO
ARE YOU ON BIRTH CONTROL: YES
HAVE YOU HAD OR DO YOU HAVE A STAPH INFECTION: NO
DO YOU USE SUNSCREEN: OCCASIONALLY
HAVE YOU HAD OR DO YOU HAVE VASCULAR DISEASE: NO
DO YOU USE A TANNING BED: NO

## 2024-10-29 ASSESSMENT — DERMATOLOGY QUALITY OF LIFE (QOL) ASSESSMENT
RATE HOW BOTHERED YOU ARE BY SYMPTOMS OF YOUR SKIN PROBLEM (EG, ITCHING, STINGING BURNING, HURTING OR SKIN IRRITATION): 3
RATE HOW EMOTIONALLY BOTHERED YOU ARE BY YOUR SKIN PROBLEM (FOR EXAMPLE, WORRY, EMBARRASSMENT, FRUSTRATION): 2
ARE THERE EXCLUSIONS OR EXCEPTIONS FOR THE QUALITY OF LIFE ASSESSMENT: NO
DATE THE QUALITY-OF-LIFE ASSESSMENT WAS COMPLETED: 67142
RATE HOW BOTHERED YOU ARE BY EFFECTS OF YOUR SKIN PROBLEMS ON YOUR ACTIVITIES (EG, GOING OUT, ACCOMPLISHING WHAT YOU WANT, WORK ACTIVITIES OR YOUR RELATIONSHIPS WITH OTHERS): 2

## 2024-10-29 ASSESSMENT — ITCH NUMERIC RATING SCALE: HOW SEVERE IS YOUR ITCHING?: 0

## 2024-10-29 ASSESSMENT — PAIN - FUNCTIONAL ASSESSMENT: PAIN_FUNCTIONAL_ASSESSMENT: 0-10

## 2024-10-31 LAB
LABORATORY COMMENT REPORT: NORMAL
PATH REPORT.FINAL DX SPEC: NORMAL
PATH REPORT.GROSS SPEC: NORMAL
PATH REPORT.MICROSCOPIC SPEC OTHER STN: NORMAL
PATH REPORT.RELEVANT HX SPEC: NORMAL
PATH REPORT.TOTAL CANCER: NORMAL

## 2024-11-12 ENCOUNTER — APPOINTMENT (OUTPATIENT)
Dept: DERMATOLOGY | Facility: CLINIC | Age: 29
End: 2024-11-12
Payer: COMMERCIAL

## 2024-11-12 DIAGNOSIS — L57.8 DIFFUSE PHOTODAMAGE OF SKIN: ICD-10-CM

## 2024-11-12 DIAGNOSIS — L40.9 PSORIASIS: Primary | ICD-10-CM

## 2024-11-12 DIAGNOSIS — L85.3 XEROSIS CUTIS: ICD-10-CM

## 2024-11-12 PROCEDURE — 99214 OFFICE O/P EST MOD 30 MIN: CPT | Performed by: DERMATOLOGY

## 2024-11-12 RX ORDER — TRIAMCINOLONE ACETONIDE 1 MG/G
CREAM TOPICAL
Qty: 80 G | Refills: 2 | Status: SHIPPED | OUTPATIENT
Start: 2024-11-12

## 2024-11-13 ENCOUNTER — OFFICE VISIT (OUTPATIENT)
Dept: ORTHOPEDIC SURGERY | Facility: CLINIC | Age: 29
End: 2024-11-13
Payer: COMMERCIAL

## 2024-11-13 DIAGNOSIS — S39.012A ACUTE MYOFASCIAL STRAIN OF LUMBAR REGION, INITIAL ENCOUNTER: ICD-10-CM

## 2024-11-13 DIAGNOSIS — M54.16 LUMBAR RADICULOPATHY: ICD-10-CM

## 2024-11-13 PROCEDURE — 1036F TOBACCO NON-USER: CPT | Performed by: PHYSICIAN ASSISTANT

## 2024-11-13 PROCEDURE — 99213 OFFICE O/P EST LOW 20 MIN: CPT | Performed by: PHYSICIAN ASSISTANT

## 2024-11-13 RX ORDER — PREDNISONE 10 MG/1
TABLET ORAL
Qty: 30 TABLET | Refills: 0 | Status: SHIPPED | OUTPATIENT
Start: 2024-11-13 | End: 2024-11-22

## 2024-11-13 NOTE — PROGRESS NOTES
"Subjective     Paola Black is a 29 y.o. female who presents for the following: Follow-up.  She states over the past 2 years, she has been developing intermittent red to purplish spots on her arms, legs, thighs, buttocks, back, and abdomen, which then scale and peel and then leave behind light spots.  She denies any other areas of involvement, including her face or neck.    Punch biopsy of a representative lesion on her left medial mid thigh performed at her last visit in our office on 10/29/24 revealed \"psoriasiform dermatitis,\" for which the histologic differential diagnosis includes psoriasis and overlap with seborrheic dermatitis.    Today, the patient states the biopsy site healed well.  Since her last visit, she states she has noticed a few new red, scaly bumps on her back, chest, thighs, and forearms.  She denies any new, changing, or concerning skin lesions since her last visit; no bleeding, itching, or burning lesions.      Review of Systems:  No other skin or systemic complaints other than what is documented elsewhere in the note.    The following portions of the chart were reviewed this encounter and updated as appropriate:       Skin Cancer History  No skin cancer on file.    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 Take with food., Disp: 30 tablet, Rfl: 11    ketoconazole (NIZOral) 2 % shampoo, Wash affected areas of scalp 2-3 times weekly as directed, Disp: 120 mL, Rfl: 11    Linzess 72 mcg capsule, TAKE 1 CAPSULE (72 MCG) BY MOUTH ONCE DAILY., Disp: 30 capsule, Rfl: 2    medroxyPROGESTERone 150 mg/mL injection syringe, INJECT 1 (ONE) MILLILITER EVERY 90 DAYS, Disp: , Rfl:     omeprazole (PriLOSEC) 20 mg DR capsule, Take 1 capsule (20 mg) by mouth once daily. Do not crush or chew., Disp: 90 capsule, Rfl: 3    cyclobenzaprine (Flexeril) 10 mg tablet, Take 1 tablet (10 mg) by mouth 2 times a day as needed for muscle spasms (Pain) for up to 5 days., Disp: 10 tablet, Rfl: 0    gabapentin (Neurontin) 100 mg capsule, Take 1 capsule (100 mg) by mouth once daily at bedtime., Disp: , Rfl:     gabapentin (Neurontin) 300 mg capsule, Take 1 capsule (300 mg) by mouth once daily at bedtime for 14 days., Disp: 14 capsule, Rfl: 0    triamcinolone (Kenalog) 0.1 % cream, Apply twice daily to affected areas of body (avoid face, groin, body folds) for 2 weeks, then taper to twice daily on weekends only; repeat every 6-8 weeks as needed for flares, Disp: 80 g, Rfl: 2     Objective   Well appearing patient in no apparent distress; mood and affect are within normal limits.    A full examination was performed including scalp, face, eyes, ears, nose, lips, neck, chest, axillae, abdomen, back, bilateral upper extremities, and bilateral lower extremities. All findings within normal limits unless otherwise noted below.    Assessment/Plan   1. Psoriasis  On the patient's trunk and all 4 extremities, there are multiple well-demarcated, erythematous, scaly papules and small plaques with overlying whitish,  micaceous scale as well as several hypo- and hyperpigmented macules and small patches consistent with post-inflammatory pigmentary changes    Biopsy-proven psoriasiform dermatitis - trunk and extremities.  Based on the patient's history, recent exam and repeat exam today, and the histologic findings in her recent biopsy, the favored clinico-pathologic diagnosis for these lesions is most likely Psoriasis.  The potentially chronic and intermittently flaring nature of this condition and treatment options were discussed extensively with the patient today.  I spent a great deal of time discussing various treatment options, including topical therapy, UV phototherapy, and systemic therapy, such as with Methotrexate, Otezla, a TNF-alpha inhibitor like Enbrel or Humira, and other biologic medications, such as Stelara, Cosentyx, Taltz, Tremfya, and Skyrizi.  After discussing the risks, benefits, and side effects of each of these options at length, the patient expressed understanding and states she wishes to begin with topical therapy.    Thus, at this time, I recommend topical steroid therapy with Triamcinolone 0.1% cream, which the patient was instructed to apply twice daily to the affected areas of her body (avoid face, groin, body folds) for the next 2 weeks, followed by taper to twice daily on weekends only for persistent areas and/or maintenance and moisturization with a recommended over-the-counter moisturizing cream, such as Eucerin, or Vaseline twice daily on weekdays; the patient may repeat treatment in a 2-week burst-and-taper fashion every 6-8 weeks as needed for future flares.  The risks, benefits, and side effects of this medication, including possible skin atrophy with overuse of topical steroids, were discussed.  Should she develop a flare of this condition unresponsive to topical therapy in the future, she was instructed to call our office to schedule a return visit for re-evaluation and further treatment  considerations if indicated at that time.  She expressed understanding and is in agreement with this plan.  Of note, her suture was removed in the office today as well.    triamcinolone (Kenalog) 0.1 % cream  Apply twice daily to affected areas of body (avoid face, groin, body folds) for 2 weeks, then taper to twice daily on weekends only; repeat every 6-8 weeks as needed for flares    2. Xerosis cutis  Diffuse generalized dry, scaly skin.    Xerosis.  I emphasized the importance of dry, sensitive skin care, including the use of a mild soap, such as Dove, and frequent and aggressive moisturization, at least twice daily and immediately following showers or baths, with recommended over-the-counter moisturizing creams, such as Eucerin, Cetaphil, Cerave, or Aveeno, or Vaseline or Aquaphor ointments.    3. Diffuse photodamage of skin  Photodistributed  Diffuse photodamage with actinic changes with telangiectasia and mottled pigmentation in sun-exposed areas.    Photodamage.  The signs and symptoms of skin cancer were reviewed and the patient was advised to practice sun protection and sun avoidance, use daily sunscreen, and perform regular self skin exams.  Sun protection was discussed, including avoiding the mid-day sun, wearing a sunscreen with SPF at least 50, and stressing the need for reapplication of sunscreen and applying more than they think they need.

## 2024-11-13 NOTE — PROGRESS NOTES
"HPI:  Paola Blakc is a 29 y.o. female who presents to the spine clinic with exacerbation of low back and RLE radicular pain.     Recently seen in the office by Dr. Abarca on 10/29/24 with similar complaints. At that time she was referred to PT and was recommended OTC IBU for pain.     Unfortunately, she reports fall Tues 11/05/24. She tells me he legs \"gave out\" and fell landing on the stairs on her back and right buttock/hip. Reports extensive bruising over the hip.  Since that time notes increased back and RLE pain from the buttock into the posterior thigh to the calf. Admits to intermittent numbness/tingling that has increased in frequency since her fall. Feels weak with hip flexion.     Reports 10/10 pain. Has only been taking IBU occasionally in addition to nightly Flexeril and topical ointment.     Has PT evaluation scheduled for 12/05/24.    ROS:  Reviewed on EMR and patient intake sheet.    PMH/SH:  Reviewed on EMR and patient intake sheet.    Exam:  Physical Exam  Spine Musculoskeletal Exam    Well appearing, NAD  Pleasant & cooperative  BMI 45.19  Decreased ROM lumbar spine with rotation, flexion/extension  + paraspinal muscle spasms  Decreased sensation RLE to light touch in non-dermatomal distrubution  R HF, KE, KF, DF, PF 4/5 (decreased effort / pain limited)  LLE motor 5/5 throughout  normal gait & station; no assistive devices    Radiology:     No new imaging today    Assessment and Plan:  1. Acute myofascial strain of lumbar region, initial encounter  2. Lumbar radiculopathy  - MR lumbar spine wo IV contrast; Future  - predniSONE (Deltasone) 10 mg tablet; Take 5 tablets (50 mg) by mouth once daily for 2 days, THEN 4 tablets (40 mg) once daily for 2 days, THEN 3 tablets (30 mg) once daily for 2 days, THEN 2 tablets (20 mg) once daily for 2 days, THEN 1 tablet (10 mg) once daily for 2 days.  Dispense: 30 tablet; Refill: 0    Patient with continued low back and RLE pain that has increased since a " mechanical fall. Her pain is very distressing to her.     At this time a Prednisone taper x 10 days recommended for her acute pain. She was provided a list of home exercises to do on her own while she awaits formal PT eval.     I placed an order for MRI for her today. We did discuss pending her insurance policy this may not be approved.    Follow up after the above is completed, or sooner if needed.     Patient in agreement to plan of care. Of course Paola Black is welcome to call at anytime with questions or concerns.     Helena Sweet PA-C  Department of Orthopaedic Surgery    [address] [place] [address]    Voicemail: [phone]   Appts: [phone]  Fax: [phone]

## 2024-11-27 ENCOUNTER — APPOINTMENT (OUTPATIENT)
Dept: RADIOLOGY | Facility: HOSPITAL | Age: 29
End: 2024-11-27
Payer: COMMERCIAL

## 2024-12-05 ENCOUNTER — APPOINTMENT (OUTPATIENT)
Dept: PHYSICAL THERAPY | Facility: CLINIC | Age: 29
End: 2024-12-05
Payer: COMMERCIAL

## 2024-12-12 ENCOUNTER — EVALUATION (OUTPATIENT)
Dept: PHYSICAL THERAPY | Facility: CLINIC | Age: 29
End: 2024-12-12
Payer: COMMERCIAL

## 2024-12-12 DIAGNOSIS — M54.50 ACUTE MIDLINE LOW BACK PAIN, UNSPECIFIED WHETHER SCIATICA PRESENT: Primary | ICD-10-CM

## 2024-12-12 DIAGNOSIS — S39.012A ACUTE MYOFASCIAL STRAIN OF LUMBAR REGION, INITIAL ENCOUNTER: ICD-10-CM

## 2024-12-12 PROCEDURE — 97161 PT EVAL LOW COMPLEX 20 MIN: CPT | Mod: GP | Performed by: PHYSICAL THERAPIST

## 2024-12-12 PROCEDURE — 97110 THERAPEUTIC EXERCISES: CPT | Mod: GP | Performed by: PHYSICAL THERAPIST

## 2024-12-12 ASSESSMENT — PATIENT HEALTH QUESTIONNAIRE - PHQ9
2. FEELING DOWN, DEPRESSED OR HOPELESS: NOT AT ALL
SUM OF ALL RESPONSES TO PHQ9 QUESTIONS 1 AND 2: 0
1. LITTLE INTEREST OR PLEASURE IN DOING THINGS: NOT AT ALL

## 2024-12-12 ASSESSMENT — ENCOUNTER SYMPTOMS
LOSS OF SENSATION IN FEET: 1
DEPRESSION: 0
OCCASIONAL FEELINGS OF UNSTEADINESS: 1
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
## 2024-12-15 PROBLEM — M54.50 ACUTE LOW BACK PAIN: Status: ACTIVE | Noted: 2024-12-15

## 2024-12-17 ENCOUNTER — TREATMENT (OUTPATIENT)
Dept: PHYSICAL THERAPY | Facility: CLINIC | Age: 29
End: 2024-12-17
Payer: COMMERCIAL

## 2024-12-17 DIAGNOSIS — S39.012A ACUTE MYOFASCIAL STRAIN OF LUMBAR REGION, INITIAL ENCOUNTER: ICD-10-CM

## 2024-12-17 DIAGNOSIS — M54.50 ACUTE MIDLINE LOW BACK PAIN, UNSPECIFIED WHETHER SCIATICA PRESENT: Primary | ICD-10-CM

## 2024-12-17 PROCEDURE — 97110 THERAPEUTIC EXERCISES: CPT | Mod: GP | Performed by: PHYSICAL THERAPIST

## 2024-12-17 NOTE — PROGRESS NOTES
Physical Therapy    Physical Therapy Treatment    Patient Name: Paola Black  MRN: [id number]  Today's Date: 12/17/2024    Time Entry:   Time Calculation  Start Time: 1015  Stop Time: 1100  Time Calculation (min): 45 min     PT Therapeutic Procedures Time Entry  Therapeutic Exercise Time Entry: 45                   Assessment: The patient tolerated all exercises with reports of fluctuating symptoms in lower back and in legs.   She reported feeling a little dizzy upon rising from supine, but resolved after drinking some water.  She also reported a cold sensation in left leg after LAQs, but her lower back pain diminished with sitting. She reported increased pain to 9/10 at end of session, but she was somewhat stoic regarding pain level as her facial expression did not reflect level of pain.  She was advised throughout session to provide  feedback to therapist so that treatment could be adjusted so pain levels do not go that high.  She reported resolution of cold sensation in LE at end of session.  She  ambulated out of the clinic in no apparent distress.        Plan: Consider e-stim during session to modulate pain levels.        Current Problem  1. Acute midline low back pain, unspecified whether sciatica present        2. Acute myofascial strain of lumbar region, initial encounter  Follow Up In Physical Therapy          General:  Reason for Referral: S39.012A (ICD-10-CM) - Acute myofascial strain of lumbar region, initial encounter  Referred By: Dr. 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 Was able to tolerate 2 minutes.  It lasted about 2 hours. Feels that the exercises haven't made anything worse.  Yesterday was standing to bake cookies and her back was throbbing. Denies falls.     Precautions:  Precautions  STEADI Fall  "Risk Score (The score of 4 or more indicates an increased risk of falling): 7          Pain: 7.5./10 central LBP       Objective     Treatments:  Therapeutic Exercise:  -Prone Prop on elbows x 2 minutes  -Prone press up x 10   -Supine TA contraction x 10, 5\" (no PPT)  -Supine TA hip adduction with ball squeeze x 10, 5\"  -Supine TA with hip abduction green t-band x 10, 5\"   -Piriformis stretch 3x 20\" R/L with towel  -LAQ x 15, 5\" R/L   -heel toes raises 2 x 10  -TA with wall slide 2 x 10         OP EDUCATION: Attempt abdominal bracing while standing to offer support to lower back.  Or try propping one leg up on cabinet below the sink to reduce strain on right LE. Use ice or heat to lower back to reduce pain.     Access Code: [email or web address]  URL: [email or web address]  Date: 12/17/2024  Prepared by: Karolyn Lay    Exercises  - Hooklying Transversus Abdominis Palpation  - 1 x daily - 7 x weekly - 1-2 sets - 10 reps - 5 seconds hold  - Supine Hip Adduction Isometric with Ball  - 1 x daily - 7 x weekly - 1-2 sets - 10 reps - 5 seconds hold  - Hooklying Clamshell with Resistance  - 1 x daily - 7 x weekly - 1-2 sets - 10 reps - 5 seconds hold  - Supine Piriformis Stretch with Foot on Ground  - 1 x daily - 7 x weekly - 1 sets - 3 reps - 20 seconds hold  - Heel Toe Raises with Counter Support  - 1 x daily - 7 x weekly - 2 sets - 10 reps  - Seated Long Arc Quad  - 1 x daily - 7 x weekly - 2 sets - 15 reps       Goals:  Active       PT Problem       Patient will be independent and adherent to HEP to enhance functional progress and long term management of condition.        Start:  12/15/24    Expected End:  01/26/25            Patient will improved Modified Oswestry score by 13 points indicating meaningful clinical change in functional mobility.        Start:  12/15/24    Expected End:  01/26/25            Patient will report reduction of back and LE pain and numbness to max of 1/10 to ease performance " functional mobility and IADLs.       Start:  12/15/24    Expected End:  01/26/25            Patient will increase LE and trunk strength by 1/3 MMT grade to facilitate muscle performance necessary for normal stair negotiation and ability to lift her son.         Start:  12/15/24    Expected End:  01/26/25            Patient will be able to ascend and descend 12 stairs with reciprocal pattern with no compensation and good eccentric control.       Start:  12/15/24    Expected End:  01/26/25            Patient will perform sit to stand transfers without compensatory strategy.        Start:  12/15/24    Expected End:  01/26/25            The patient will resume work activities and recreational fitness program at the [facility] at 100% prior capacity.        Start:  12/15/24    Expected End:  01/26/25

## 2024-12-20 ENCOUNTER — APPOINTMENT (OUTPATIENT)
Dept: PHYSICAL THERAPY | Facility: CLINIC | Age: 29
End: 2024-12-20
Payer: COMMERCIAL

## 2024-12-26 ENCOUNTER — APPOINTMENT (OUTPATIENT)
Dept: PHYSICAL THERAPY | Facility: CLINIC | Age: 29
End: 2024-12-26
Payer: COMMERCIAL

## 2025-01-07 ENCOUNTER — APPOINTMENT (OUTPATIENT)
Dept: PHYSICAL THERAPY | Facility: CLINIC | Age: 30
End: 2025-01-07
Payer: COMMERCIAL

## 2025-01-10 ENCOUNTER — APPOINTMENT (OUTPATIENT)
Dept: PHYSICAL THERAPY | Facility: CLINIC | Age: 30
End: 2025-01-10
Payer: COMMERCIAL

## 2025-01-14 ENCOUNTER — TREATMENT (OUTPATIENT)
Dept: PHYSICAL THERAPY | Facility: CLINIC | Age: 30
End: 2025-01-14
Payer: COMMERCIAL

## 2025-01-14 DIAGNOSIS — S39.012A ACUTE MYOFASCIAL STRAIN OF LUMBAR REGION, INITIAL ENCOUNTER: ICD-10-CM

## 2025-01-14 PROCEDURE — 97110 THERAPEUTIC EXERCISES: CPT | Mod: GP

## 2025-01-14 NOTE — PROGRESS NOTES
"Physical Therapy                                                                                  Physical Therapy Treatment       Patient name: Paola Black  MRN:   [id number]  Today's Date: 1/14/2025  3      Time Calculation  Start Time: 1536  Stop Time: 1622  Time Calculation (min): 46 min    Assessment:  Patient demonstrates poor tolerance for therapeutic exercises  this date.  Unable to determine direction of preference, but lumbar flexion may be a little worse. Symptoms of pain and tingling in LB and legs fluctuated throughout session.   Despite high levels of reported increase in pain, patient left clinic in no apparent distress.      Plan:    Monitor response to treatment and adjust plan as needed.   To continue with current POC with emphasis on determining directional preference, decreasing pain, increasing core strength.      Current Problem  1. Acute midline low back pain, unspecified whether sciatica present          2. Acute myofascial strain of lumbar region, initial encounter  Follow Up In Physical Therapy       General:  Reason for Referral: S39.012A (ICD-10-CM) - Acute myofascial strain of lumbar region, initial encounter  Referred By: Dr. 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   Better with movement and walking around.  Still painful standing in one position for greater than 2 mins. Not taking any pain medications.   Response to last session:   no problems  Performing HEP: yes     Pain  7/10 central LB at the start of session  Increased pain at the end of session     Treatment:    Therapeutic exercises 46 mins  Recumbent scifit, seat 14, level 1.0 x 10 mins    Hooklying  PPT 5\" hold x 10 reps (stopped due to reproduction of BLE tingling)    Prone  Prone lying x 2 mins  Prone press ups  1 x 10 reps (increased tingling in LLE to " "foot)   B glut sets 5\" x 5 reps (increased tingling in BLE's)     Hooklying  B hip ADD ball squeeze 3\" hold 2 x 10  TA bracing 3\" hold 1 x 10, 1 x 8   SKTC 10\" hold, 2 x 5 reps  TA bracing with alt R/L bent knee raises 2 x 10 reps  B hip ABD bent knee fall outs 2 x 10 reps  R/L unilateral alt hip ABD bent knee 2 x 10   B shoulder horizontal ABD with orange theraband, palms up/palms down  2 x 10      Education:  Access Code: [email or web address]  URL: [email or web address]  Date: 12/17/2024  Prepared by: Karolyn Lay     Exercises  - Hooklying Transversus Abdominis Palpation  - 1 x daily - 7 x weekly - 1-2 sets - 10 reps - 5 seconds hold  - Supine Hip Adduction Isometric with Ball  - 1 x daily - 7 x weekly - 1-2 sets - 10 reps - 5 seconds hold  - Hooklying Clamshell with Resistance  - 1 x daily - 7 x weekly - 1-2 sets - 10 reps - 5 seconds hold  - Supine Piriformis Stretch with Foot on Ground  - 1 x daily - 7 x weekly - 1 sets - 3 reps - 20 seconds hold  - Heel Toe Raises with Counter Support  - 1 x daily - 7 x weekly - 2 sets - 10 reps  - Seated Long Arc Quad  - 1 x daily - 7 x weekly - 2 sets - 15 reps    Goals  Active       PT Problem       Patient will be independent and adherent to HEP to enhance functional progress and long term management of condition.        Start:  12/15/24    Expected End:  01/26/25            Patient will improved Modified Oswestry score by 13 points indicating meaningful clinical change in functional mobility.        Start:  12/15/24    Expected End:  01/26/25            Patient will report reduction of back and LE pain and numbness to max of 1/10 to ease performance functional mobility and IADLs.       Start:  12/15/24    Expected End:  01/26/25            Patient will increase LE and trunk strength by 1/3 MMT grade to facilitate muscle performance necessary for normal stair negotiation and ability to lift her son.         Start:  12/15/24    Expected End:  01/26/25       "      Patient will be able to ascend and descend 12 stairs with reciprocal pattern with no compensation and good eccentric control.       Start:  12/15/24    Expected End:  01/26/25            Patient will perform sit to stand transfers without compensatory strategy.        Start:  12/15/24    Expected End:  01/26/25            The patient will resume work activities and recreational fitness program at the [facility] at 100% prior capacity.        Start:  12/15/24    Expected End:  01/26/25

## 2025-01-28 ENCOUNTER — TREATMENT (OUTPATIENT)
Dept: PHYSICAL THERAPY | Facility: CLINIC | Age: 30
End: 2025-01-28
Payer: COMMERCIAL

## 2025-01-28 DIAGNOSIS — S39.012A ACUTE MYOFASCIAL STRAIN OF LUMBAR REGION, INITIAL ENCOUNTER: ICD-10-CM

## 2025-01-28 PROCEDURE — 97110 THERAPEUTIC EXERCISES: CPT | Mod: GP | Performed by: PHYSICAL THERAPIST

## 2025-01-28 NOTE — PROGRESS NOTES
Physical Therapy    Physical Therapy Treatment    Patient Name: Paola Black  MRN: [id number]  Today's Date: 1/28/2025    Time Entry:   Time Calculation  Start Time: 1300  Stop Time: 1344  Time Calculation (min): 44 min     PT Therapeutic Procedures Time Entry  Therapeutic Exercise Time Entry: 44                   Assessment: Patient tolerated more active exercises this date with frequent changes in position, compared to long stretch in supine.  She had an increase in pain while doing the seated stepper, but pain reduced as she was walking on the treadmill.   She demonstrated weakness in right quadriceps with SLR and with wall slides, but these exercises did not increase her pain.  She reported no change in back pain at the end of session, rated between 8 and 8.5/10.       Plan: Continue with general strengthening program with frequent changes in position, monitoring pain levels.        Current Problem  1. Acute myofascial strain of lumbar region, initial encounter  Follow Up In Physical Therapy          General:  Reason for Referral: S39.012A (ICD-10-CM) - Acute myofascial strain of lumbar region, initial encounter  Referred By: Dr. 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  Sitting and getting up are sightly better.  She reports if she stands to wash dishes she gets a throbbing pain in center of back, but tightening her abdominal muscles is helpful. She is unable to sleep on her belly or her lower back, must sleep on her sides. She is working 3 nights per week, 8 hour shifts at [employer], but does not have to do any heavy lifting. Walks> 10,000 steps.  Is unable to lie on her belly for 2 minutes.  If she skips a day, she is able to tolerate more home program. Wants to know if  "she can use the treadmill.     Precautions:  STEADI Fall Risk Score (The score of 4 or more indicates an increased risk of falling): 7         Pain: 8.5/10 center lower back, tingling in right lower leg.        Objective   Treatments:    Therapeutic exercises:x 44 minutes    Recumbent scifit, seat 14, level 1.0 x 10 mins  -Walk on TM at 1.2mph x 5 minutes  -Standing heel toe raises 2 x 10  -standing HS curls 4 x 5 reps R/L  -Standing hip abduction 4 x 5 R/L  -Supine TA with SLR 3 x 5 reps Fatigue in right LE  -mini wall squats with TA engaged x 10   -seated HS stretch 2 x 20\" R/L          Deferred 1/28/25:  Hooklying  PPT 5\" hold x 10 reps (stopped due to reproduction of BLE tingling)     Prone  Prone lying x 2 mins  Prone press ups  1 x 10 reps (increased tingling in LLE to foot)   B glut sets 5\" x 5 reps (increased tingling in BLE's)      Hooklying  B hip ADD ball squeeze 3\" hold 2 x 10  TA bracing 3\" hold 1 x 10, 1 x 8   SKTC 10\" hold, 2 x 5 reps  TA bracing with alt R/L bent knee raises 2 x 10 reps  B hip ABD bent knee fall outs 2 x 10 reps  R/L unilateral alt hip ABD bent knee 2 x 10   B shoulder horizontal ABD with orange theraband, palms up/palms down  2 x 10       OP EDUCATION: Patient and her fiance' were educated on spinal anatomy as it relates to pt's symptoms.        Goals:  Active       PT Problem       Patient will be independent and adherent to HEP to enhance functional progress and long term management of condition.        Start:  12/15/24    Expected End:  01/26/25            Patient will improved Modified Oswestry score by 13 points indicating meaningful clinical change in functional mobility.        Start:  12/15/24    Expected End:  01/26/25            Patient will report reduction of back and LE pain and numbness to max of 1/10 to ease performance functional mobility and IADLs.       Start:  12/15/24    Expected End:  01/26/25            Patient will increase LE and trunk strength by 1/3 MMT " grade to facilitate muscle performance necessary for normal stair negotiation and ability to lift her son.         Start:  12/15/24    Expected End:  01/26/25            Patient will be able to ascend and descend 12 stairs with reciprocal pattern with no compensation and good eccentric control.       Start:  12/15/24    Expected End:  01/26/25            Patient will perform sit to stand transfers without compensatory strategy.        Start:  12/15/24    Expected End:  01/26/25            The patient will resume work activities and recreational fitness program at the [facility] at 100% prior capacity.        Start:  12/15/24    Expected End:  01/26/25

## 2025-01-30 ENCOUNTER — APPOINTMENT (OUTPATIENT)
Dept: PHYSICAL THERAPY | Facility: CLINIC | Age: 30
End: 2025-01-30
Payer: COMMERCIAL

## 2025-01-30 ENCOUNTER — DOCUMENTATION (OUTPATIENT)
Dept: PHYSICAL THERAPY | Facility: CLINIC | Age: 30
End: 2025-01-30
Payer: COMMERCIAL

## 2025-01-30 DIAGNOSIS — M54.50 ACUTE MIDLINE LOW BACK PAIN, UNSPECIFIED WHETHER SCIATICA PRESENT: Primary | ICD-10-CM

## 2025-01-30 NOTE — PROGRESS NOTES
Physical Therapy                 Therapy Communication Note    Patient Name: Paola Black  MRN: [id number]  Department: [facility]  Room: Room/bed info not found  Today's Date: 1/30/2025     Discipline: Physical Therapy          Missed Visit Reason:      Missed Time: Cancel and rescheduled for 1/31/25    Comment: 9:45

## 2025-01-30 NOTE — PROGRESS NOTES
"Physical Therapy    Physical Therapy Treatment/Recheck    Patient Name: Paola Black  MRN: [id number]  Today's Date: 1/30/2025    Time Entry:                             Assessment:     Plan:       Current Problem  No diagnosis found.    General:  Reason for Referral: S39.012A (ICD-10-CM) - Acute myofascial strain of lumbar region, initial encounter  Referred By: Dr. 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    Outcome Measures:  {PT Outcome Measures:03848}  Treatments:  {PT Treatments:34350}  Therapeutic exercises:x 44 minutes     Recumbent scifit, seat 14, level 1.0 x 10 mins  -Walk on TM at 1.2mph x 5 minutes  -Standing heel toe raises 2 x 10  -standing HS curls 4 x 5 reps R/L  -Standing hip abduction 4 x 5 R/L  -Supine TA with SLR 3 x 5 reps Fatigue in right LE  -mini wall squats with TA engaged x 10   -seated HS stretch 2 x 20\" R/L     OP EDUCATION:       Goals:  Active       PT Problem       Patient will be independent and adherent to HEP to enhance functional progress and long term management of condition.        Start:  12/15/24    Expected End:  01/26/25            Patient will improved Modified Oswestry score by 13 points indicating meaningful clinical change in functional mobility.        Start:  12/15/24    Expected End:  01/26/25            Patient will report reduction of back and LE pain and numbness to max of 1/10 to ease performance functional mobility and IADLs.       Start:  12/15/24    Expected End:  01/26/25            Patient will increase LE and trunk " strength by 1/3 MMT grade to facilitate muscle performance necessary for normal stair negotiation and ability to lift her son.         Start:  12/15/24    Expected End:  01/26/25            Patient will be able to ascend and descend 12 stairs with reciprocal pattern with no compensation and good eccentric control.       Start:  12/15/24    Expected End:  01/26/25            Patient will perform sit to stand transfers without compensatory strategy.        Start:  12/15/24    Expected End:  01/26/25            The patient will resume work activities and recreational fitness program at the [facility] at 100% prior capacity.        Start:  12/15/24    Expected End:  01/26/25

## 2025-01-31 ENCOUNTER — APPOINTMENT (OUTPATIENT)
Dept: PHYSICAL THERAPY | Facility: CLINIC | Age: 30
End: 2025-01-31
Payer: COMMERCIAL

## 2025-01-31 ENCOUNTER — DOCUMENTATION (OUTPATIENT)
Dept: PHYSICAL THERAPY | Facility: CLINIC | Age: 30
End: 2025-01-31
Payer: COMMERCIAL

## 2025-01-31 DIAGNOSIS — M54.50 ACUTE MIDLINE LOW BACK PAIN, UNSPECIFIED WHETHER SCIATICA PRESENT: Primary | ICD-10-CM

## 2025-01-31 NOTE — PROGRESS NOTES
Physical Therapy                 Therapy Communication Note    Patient Name: Paola Black  MRN: [id number]    Today's Date: 1/31/2025     Discipline: Physical Therapy      Patient called as she has an appointment scheduled at 1245.  Patient reported she is severe pain today close to 10/10 in lower back.  Patient reported she did not feel she could participate well in therapy today.  After discussion with PT, patient and PT decided to hold session today.  Patient was educated to reach out to her physician or go to ED if pain is too severe.

## 2025-01-31 NOTE — PROGRESS NOTES
"Physical Therapy                                                                                  Physical Therapy Treatment       Patient name: Paola Black  MRN:   [id number]  Today's Date: 1/31/2025  5         1. Acute midline low back pain, unspecified whether sciatica present                  Assessment:  Patient completed session today with *** effort and had difficulty with ***.  Patient demonstrated improvement with ***.   Patient will continue to benefit from PT to improve *** to work towards goals of ***.      Plan:        Monitor response to treatment and adjust plan as needed.   To continue with current POC with emphasis on ***.           Subjective:  Patient reported ***.     Response to last session:     Performing HEP: ***     Pain     Objective:     Treatment:    ***     Recumbent scifit, seat 14, level 1.0 x 10 mins  -Walk on TM at 1.2mph x 5 minutes  -Standing heel toe raises 2 x 10  -standing HS curls 4 x 5 reps R/L  -Standing hip abduction 4 x 5 R/L  -Supine TA with SLR 3 x 5 reps Fatigue in right LE  -mini wall squats with TA engaged x 10   -seated HS stretch 2 x 20\" R/L   Hip hike  Standing at wall TA brace with march      Hooklying  B hip ADD ball squeeze 3\" hold 2 x 10  TA bracing 3\" hold 1 x 10, 1 x 8   SKTC 10\" hold, 2 x 5 reps  TA bracing with alt R/L bent knee raises 2 x 10 reps  B hip ABD bent knee fall outs 2 x 10 reps  R/L unilateral alt hip ABD bent knee 2 x 10   B shoulder horizontal ABD with orange theraband, palms up/palms down  2 x 10  Education:       June Goins, PT    Active       PT Problem       Patient will be independent and adherent to HEP to enhance functional progress and long term management of condition.        Start:  12/15/24    Expected End:  01/26/25            Patient will improved Modified Oswestry score by 13 points indicating meaningful clinical change in functional mobility.        Start:  12/15/24    Expected End:  01/26/25            Patient will report " reduction of back and LE pain and numbness to max of 1/10 to ease performance functional mobility and IADLs.       Start:  12/15/24    Expected End:  01/26/25            Patient will increase LE and trunk strength by 1/3 MMT grade to facilitate muscle performance necessary for normal stair negotiation and ability to lift her son.         Start:  12/15/24    Expected End:  01/26/25            Patient will be able to ascend and descend 12 stairs with reciprocal pattern with no compensation and good eccentric control.       Start:  12/15/24    Expected End:  01/26/25            Patient will perform sit to stand transfers without compensatory strategy.        Start:  12/15/24    Expected End:  01/26/25            The patient will resume work activities and recreational fitness program at the [facility] at 100% prior capacity.        Start:  12/15/24    Expected End:  01/26/25

## 2025-02-03 DIAGNOSIS — M54.16 LUMBAR RADICULOPATHY: Primary | ICD-10-CM

## 2025-02-03 RX ORDER — PREDNISONE 10 MG/1
TABLET ORAL
Qty: 24 TABLET | Refills: 0 | Status: SHIPPED | OUTPATIENT
Start: 2025-02-03 | End: 2025-02-10

## 2025-02-04 ENCOUNTER — TREATMENT (OUTPATIENT)
Dept: PHYSICAL THERAPY | Facility: CLINIC | Age: 30
End: 2025-02-04
Payer: COMMERCIAL

## 2025-02-04 DIAGNOSIS — S39.012A ACUTE MYOFASCIAL STRAIN OF LUMBAR REGION, INITIAL ENCOUNTER: ICD-10-CM

## 2025-02-04 DIAGNOSIS — M54.16 LUMBAR RADICULOPATHY: Primary | ICD-10-CM

## 2025-02-04 DIAGNOSIS — M54.50 ACUTE MIDLINE LOW BACK PAIN, UNSPECIFIED WHETHER SCIATICA PRESENT: Primary | ICD-10-CM

## 2025-02-04 PROCEDURE — 97014 ELECTRIC STIMULATION THERAPY: CPT | Mod: GP,CQ

## 2025-02-04 PROCEDURE — 97110 THERAPEUTIC EXERCISES: CPT | Mod: GP,CQ

## 2025-02-04 NOTE — PROGRESS NOTES
Physical Therapy                                                                                  Physical Therapy Treatment       Patient name: Paola Black  MRN:   [id number]  Today's Date: 2/4/25     Time Calculation  Start Time: 1503  Stop Time: 1548  Time Calculation (min): 45 min  1. Acute midline low back pain, unspecified whether sciatica present        2. Acute myofascial strain of lumbar region, initial encounter  Follow Up In Physical Therapy             General  Reason for Referral: S39.012A (ICD-10-CM) - Acute myofascial strain of lumbar region, initial encounter  Referred By: Dr. 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 Tingling felt into R  foot .Thus far PT intervention unsuccessful in finding directional preference or interrupting pain /symptom cycle . Pt did not require physical assist with any transfer .  Pt was able to ambulate to /from clinic without requiring assistance or assistive device . Gait ; slow hugo,slightly guarded.  Plan:    OP PT Plan  Treatment/Interventions: Cryotherapy, Education/ Instruction, Electrical stimulation, Hot pack, Manual therapy, Neuromuscular re-education, Self care/ home management, Taping techniques, Therapeutic activities, Therapeutic exercises, Ultrasound  PT Plan: Skilled PT  PT Frequency: 2 times per week  Duration: 4-6 weeks  Onset Date: 10/29/24  Number of Treatments Authorized: No Authorization Required    Continue with general strengthening program with frequent changes in position, monitoring pain levels.        Subjective: Pt continues to experience pain and radiating symptoms BLEs. Radiating symptoms felt to R foot . Sitting for a few moments relieved most of symptoms.    Complete 14,000 steps at work does take a day to feel  "recovered.  Response to last session: challenged     Performing HEP: gets through about half not every day  Unable to do exercise with band   Pain   7/10 upon arrival  10/10 end of session  Objective:   For reduction of back and LE pain and numbness to max of 1/10 to ease performance functional mobility and IADLs.   Treatment:    Heel raise 20 reps   Hip extension <10 reps  Required seated rest to relieve radiating symptoms 1 minute  MIP 20 reps  Mini squat , began to feel quad fatigue with RLE 16 reps and started to have radiating symptom to knee RLE     Required seated rest to relieve radiating symptoms 1 minute  Seated isoball squeeze between knees 19 reps 5\" holds  RLE began to fatigue (13)followed by throbbing of R low back    Standing  Hip Abduction 20 reps bilaterally symptoms as described above began to set in before completion of 20 reps    Positioned with BLEs placed on raised bench pt supine attempting to lift (with support of bench hips /knees to 90/90 degree to allow for stretch to lowback    Use of modality to reduce pain of area treated  Quadpolar cross pattern to L/S spine high/sweep 15mAs x 15 minutes pt began in side lying L position and changed from side lying L to supine during last 6 minutes .   Education:   Discussed TENS unit , OTC version.  No additions to HEP  Nida Saldana, PTA    Active       PT Problem       Patient will be independent and adherent to HEP to enhance functional progress and long term management of condition.        Start:  12/15/24    Expected End:  01/26/25            Patient will improved Modified Oswestry score by 13 points indicating meaningful clinical change in functional mobility.        Start:  12/15/24    Expected End:  01/26/25            Patient will report reduction of back and LE pain and numbness to max of 1/10 to ease performance functional mobility and IADLs.       Start:  12/15/24    Expected End:  01/26/25            Patient will increase LE and " trunk strength by 1/3 MMT grade to facilitate muscle performance necessary for normal stair negotiation and ability to lift her son.         Start:  12/15/24    Expected End:  01/26/25            Patient will be able to ascend and descend 12 stairs with reciprocal pattern with no compensation and good eccentric control.       Start:  12/15/24    Expected End:  01/26/25            Patient will perform sit to stand transfers without compensatory strategy.        Start:  12/15/24    Expected End:  01/26/25            The patient will resume work activities and recreational fitness program at the [facility] at 100% prior capacity.        Start:  12/15/24    Expected End:  01/26/25

## 2025-02-06 ENCOUNTER — APPOINTMENT (OUTPATIENT)
Dept: PHYSICAL THERAPY | Facility: CLINIC | Age: 30
End: 2025-02-06
Payer: COMMERCIAL

## 2025-02-11 ENCOUNTER — TREATMENT (OUTPATIENT)
Dept: PHYSICAL THERAPY | Facility: CLINIC | Age: 30
End: 2025-02-11
Payer: COMMERCIAL

## 2025-02-11 DIAGNOSIS — M54.50 ACUTE MIDLINE LOW BACK PAIN, UNSPECIFIED WHETHER SCIATICA PRESENT: Primary | ICD-10-CM

## 2025-02-11 DIAGNOSIS — S39.012A ACUTE MYOFASCIAL STRAIN OF LUMBAR REGION, INITIAL ENCOUNTER: ICD-10-CM

## 2025-02-11 PROCEDURE — 97110 THERAPEUTIC EXERCISES: CPT | Mod: GP

## 2025-02-11 NOTE — PROGRESS NOTES
"Physical Therapy                                                                                  Physical Therapy Treatment       Patient name: Paola Black  MRN:   [id number]  Today's Date: 2/11/2025  6      Time Calculation  Start Time: 1730  Stop Time: 1800  Time Calculation (min): 30 min    Assessment:  Patient has been seen for 6 visits without benefit.  She continues to be limited in PT by severe pain in LB and numbness/tingling in BLE's.  Unable to identify any direction of preference for reduction of symptoms.  TA bracing and PPT increase pressure/pain in LB and lumbar extension also increases symptoms.  Patient scheduled for MRI 2/18/25.  Plan to hold PT at this time.  Left clinic amb indep without a.d. or assistance, but very slow hugo and guarded movements.     Plan:     Hold PT until MRI completed.     Current Problems:     1. Acute midline low back pain, unspecified whether sciatica present        2. Acute myofascial strain of lumbar region, initial encounter  Follow Up In Physical Therapy        General  Reason for Referral: S39.012A (ICD-10-CM) - Acute myofascial strain of lumbar region, initial encounter  Referred By: Dr. 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  MRI scheduled for Tues 2/18/25. On prednisone for 3 days. States she had a nose bleed yesterday for no apparent reason.    Response to last session:  increased soreness/pain in LB and LE's  Performing HEP: only did HEP twice this week.      Pain  8/10 in central and B LB at the start of session  Increased at the end of session but no number given.     Treatment:   TM at 1.0 mph x 7 mins     Standing  TA bracing with SB behind LB  against the wall 3\" hold x 10 reps  TA bracing with alt R/L UE lifts back against the wall and SB behind LB 2 x 10   B mid rows with red " "theraband 2 x 10  B shoulder extension with red theraband 1  x 10   B heel/toe raises x 10 reps   R/L alt hip abduction x 18 reps   R/L hip extension (one foot on step) R x 10 reps, L x 4 reps  R/L hip flexor stretch at the steps 10\" hold x 4 reps     Education:  Continue with HEP only if they do not increase pain or numbness/tingling.  Patient verbalizes understanding and agreement.       Goals    Active       PT Problem       Patient will be independent and adherent to HEP to enhance functional progress and long term management of condition.        Start:  12/15/24    Expected End:  01/26/25            Patient will improved Modified Oswestry score by 13 points indicating meaningful clinical change in functional mobility.        Start:  12/15/24    Expected End:  01/26/25            Patient will report reduction of back and LE pain and numbness to max of 1/10 to ease performance functional mobility and IADLs.       Start:  12/15/24    Expected End:  01/26/25            Patient will increase LE and trunk strength by 1/3 MMT grade to facilitate muscle performance necessary for normal stair negotiation and ability to lift her son.         Start:  12/15/24    Expected End:  01/26/25            Patient will be able to ascend and descend 12 stairs with reciprocal pattern with no compensation and good eccentric control.       Start:  12/15/24    Expected End:  01/26/25            Patient will perform sit to stand transfers without compensatory strategy.        Start:  12/15/24    Expected End:  01/26/25            The patient will resume work activities and recreational fitness program at the [facility] at 100% prior capacity.        Start:  12/15/24    Expected End:  01/26/25                   "

## 2025-02-18 ENCOUNTER — HOSPITAL ENCOUNTER (OUTPATIENT)
Dept: RADIOLOGY | Facility: HOSPITAL | Age: 30
Discharge: HOME | End: 2025-02-18
Payer: COMMERCIAL

## 2025-02-18 DIAGNOSIS — M54.16 LUMBAR RADICULOPATHY: ICD-10-CM

## 2025-02-18 PROCEDURE — 72148 MRI LUMBAR SPINE W/O DYE: CPT

## 2025-02-18 PROCEDURE — 72148 MRI LUMBAR SPINE W/O DYE: CPT | Performed by: RADIOLOGY

## 2025-02-20 ENCOUNTER — TELEPHONE (OUTPATIENT)
Dept: DERMATOLOGY | Facility: CLINIC | Age: 30
End: 2025-02-20
Payer: COMMERCIAL

## 2025-02-20 NOTE — TELEPHONE ENCOUNTER
Pt is needing follow up appt with Dr Moy for spots that are not responding to the medication given ,

## 2025-02-21 ENCOUNTER — OFFICE VISIT (OUTPATIENT)
Dept: ORTHOPEDIC SURGERY | Facility: CLINIC | Age: 30
End: 2025-02-21
Payer: COMMERCIAL

## 2025-02-21 DIAGNOSIS — S39.012A ACUTE MYOFASCIAL STRAIN OF LUMBAR REGION, INITIAL ENCOUNTER: Primary | ICD-10-CM

## 2025-02-21 DIAGNOSIS — M54.16 LUMBAR RADICULOPATHY: ICD-10-CM

## 2025-02-21 DIAGNOSIS — M51.360 DISCOGENIC LOW BACK PAIN: ICD-10-CM

## 2025-02-21 PROCEDURE — 99214 OFFICE O/P EST MOD 30 MIN: CPT | Performed by: ORTHOPAEDIC SURGERY

## 2025-02-21 NOTE — LETTER
February 21, 2025     Patient: Paola Black   YOB: 1995   Date of Visit: 2/21/2025       To Whom It May Concern:    It is my medical opinion that Paola Black may return to light duty immediately with the following restrictions: no lifting anything greater than 10 pounds .    If you have any questions or concerns, please don't hesitate to call.         Sincerely,        Tay Abarca MD

## 2025-02-21 NOTE — PROGRESS NOTES
HPI:Paola Black is a 29-year-old woman who comes in today for follow-up.  She continues to have a throbbing-like sensation in her back with intermittent symptoms that go into the legs.  She has now completed physical therapy.  She is taking anti-inflammatories.  She comes to review her MRI.      ROS:  Reviewed on EMR and patient intake sheet.    PMH/SH:  Reviewed on EMR and patient intake sheet.    Exam:  Physical Exam    Constitutional: Well appearing; no acute distress  Eyes: pupils are equal and round  Psych: normal affect  Respiratory: non-labored breathing  Cardiovascular: regular rate and rhythm  GI: non-distended abdomen  Musculoskeletal: no pain with range of motion of the hips bilaterally  Neurologic: [4+]/5 strength in the lower extremities bilaterally]; [negative] straight leg raise    Radiology:     MRI was personally reviewed.  There is evidence of mild disc degeneration at L2-3 L3-4 and L4-5.  No significant central canal stenosis.  No evidence of acute trauma and/or disc herniations.  Note is made of some cysts in the left kidney.    Diagnosis:    Discogenic back pain    Assessment and Plan:   29-year-old woman with some chronic discogenic back pain.  MRI does not demonstrate any stenosis and/or neural compression.  She will not require surgical intervention.  We did talk about potential steroid injections if her pain is persistent.  We discussed the role of pain management and steroid injections.  This included a brief overview of the injection procedure itself, the rationale behind this procedure, and the appropriate expectations for treatment of the patient's pain.  A referral to a pain management specialist was offered to the patient.  She was also given a referral to urology for the cysts which were identified on the MRI.  I can see her back as needed.    The patient was in agreement with the plan. At the end of the visit today, the patient felt that all questions had been answered  satisfactorily.  The patient was pleased with the visit and very appreciative for the care rendered.     Thank you very much for the kind referral.  It is a privilege, and a pleasure, to partner with you in the care of your patients.  I would be delighted to assist you with any further consultations as needed.          Tay Abarca MD    Chief of Spine Surgery, [facility]  Director of Spine Service, [facility]  , Department of Orthopaedics  [facility]  [address] [id number]  P: [phone]  [facility]    This note was dictated with voice recognition software.  It has not been proofread for grammatical errors, typographical mistakes or other semantic inconsistencies.

## 2025-02-25 ENCOUNTER — APPOINTMENT (OUTPATIENT)
Dept: PHYSICAL THERAPY | Facility: CLINIC | Age: 30
End: 2025-02-25
Payer: COMMERCIAL

## 2025-02-25 DIAGNOSIS — M54.50 ACUTE MIDLINE LOW BACK PAIN, UNSPECIFIED WHETHER SCIATICA PRESENT: Primary | ICD-10-CM

## 2025-02-25 NOTE — PROGRESS NOTES
Physical Therapy    Physical Therapy Treatment/Recheck/Discharge    Patient Name: Paola Black  MRN: [id number]  Today's Date: 2/25/2025    Time Entry:                             Assessment:     Plan:       Current Problem  1. Acute midline low back pain, unspecified whether sciatica present            General:  Reason for Referral: S39.012A (ICD-10-CM) - Acute myofascial strain of lumbar region, initial encounter  Referred By: Dr. 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     Lumbar AROM:  Flex:  Ext:  SB R:   SB L:  Rot R:  Rot L:  LE/Lumbopelvic Strength:  Hip Flex: R: L:  Hip ER: R: L:  Hip IR: R: L:  Knee Ext: R: L:  Ankle DF: R: L:  Bilateral Bridge:  Unilateral Bridge: R: L:  Rectus Abdominis:   Transverse Abdominis/Sahrmann Level:  Hip Abduction: R: L:  Hip Extension: R: L:  Knee Flexion: R:               L:    Flexibility:  Hamstrings: R:        L:  Quads: R:          L:  Piriformis: R:     L:  Quadratus Lumborum: R:      L:  Hip Flexors: R:          L:     OP EDUCATION:       Goals:  Active       PT Problem       Patient will be independent and adherent to HEP to enhance functional progress and long term management of condition.        Start:  12/15/24    Expected End:  01/26/25            Patient will improved Modified Oswestry score by 13 points indicating meaningful clinical change in functional mobility.        Start:  12/15/24    Expected End:  01/26/25            Patient will report reduction of back and LE pain and numbness to max of 1/10 to ease  performance functional mobility and IADLs.       Start:  12/15/24    Expected End:  01/26/25            Patient will increase LE and trunk strength by 1/3 MMT grade to facilitate muscle performance necessary for normal stair negotiation and ability to lift her son.         Start:  12/15/24    Expected End:  01/26/25            Patient will be able to ascend and descend 12 stairs with reciprocal pattern with no compensation and good eccentric control.       Start:  12/15/24    Expected End:  01/26/25            Patient will perform sit to stand transfers without compensatory strategy.        Start:  12/15/24    Expected End:  01/26/25            The patient will resume work activities and recreational fitness program at the [facility] at 100% prior capacity.        Start:  12/15/24    Expected End:  01/26/25

## 2025-02-27 ENCOUNTER — APPOINTMENT (OUTPATIENT)
Dept: GASTROENTEROLOGY | Facility: CLINIC | Age: 30
End: 2025-02-27
Payer: COMMERCIAL

## 2025-03-11 ENCOUNTER — APPOINTMENT (OUTPATIENT)
Dept: DERMATOLOGY | Facility: CLINIC | Age: 30
End: 2025-03-11
Payer: COMMERCIAL

## 2025-03-11 DIAGNOSIS — L40.9 PSORIASIS: Primary | ICD-10-CM

## 2025-03-11 DIAGNOSIS — L85.3 XEROSIS CUTIS: ICD-10-CM

## 2025-03-11 DIAGNOSIS — L57.8 DIFFUSE PHOTODAMAGE OF SKIN: ICD-10-CM

## 2025-03-11 PROCEDURE — 99214 OFFICE O/P EST MOD 30 MIN: CPT | Performed by: DERMATOLOGY

## 2025-03-11 RX ORDER — FAMOTIDINE 20 MG/1
TABLET, FILM COATED ORAL
COMMUNITY
Start: 2025-01-10

## 2025-03-11 RX ORDER — TRIAMCINOLONE ACETONIDE 1 MG/G
OINTMENT TOPICAL
Qty: 80 G | Refills: 1 | Status: SHIPPED | OUTPATIENT
Start: 2025-03-11

## 2025-03-11 ASSESSMENT — DERMATOLOGY PATIENT ASSESSMENT
DO YOU HAVE IRREGULAR MENSTRUAL CYCLES: NO
DO YOU HAVE ANY NEW OR CHANGING LESIONS: NO
ARE YOU ON BIRTH CONTROL: NO
DO YOU USE A TANNING BED: NO
ARE YOU TRYING TO GET PREGNANT: NO
DO YOU USE SUNSCREEN: DAILY

## 2025-03-11 ASSESSMENT — DERMATOLOGY QUALITY OF LIFE (QOL) ASSESSMENT
ARE THERE EXCLUSIONS OR EXCEPTIONS FOR THE QUALITY OF LIFE ASSESSMENT: NO
RATE HOW BOTHERED YOU ARE BY EFFECTS OF YOUR SKIN PROBLEMS ON YOUR ACTIVITIES (EG, GOING OUT, ACCOMPLISHING WHAT YOU WANT, WORK ACTIVITIES OR YOUR RELATIONSHIPS WITH OTHERS): 0 - NEVER BOTHERED
WHAT SINGLE SKIN CONDITION LISTED BELOW IS THE PATIENT ANSWERING THE QUALITY-OF-LIFE ASSESSMENT QUESTIONS ABOUT: PSORIASIS
DATE THE QUALITY-OF-LIFE ASSESSMENT WAS COMPLETED: 67275
RATE HOW EMOTIONALLY BOTHERED YOU ARE BY YOUR SKIN PROBLEM (FOR EXAMPLE, WORRY, EMBARRASSMENT, FRUSTRATION): 0 - NEVER BOTHERED
RATE HOW BOTHERED YOU ARE BY SYMPTOMS OF YOUR SKIN PROBLEM (EG, ITCHING, STINGING BURNING, HURTING OR SKIN IRRITATION): 0 - NEVER BOTHERED

## 2025-03-11 ASSESSMENT — PATIENT GLOBAL ASSESSMENT (PGA): PATIENT GLOBAL ASSESSMENT: PATIENT GLOBAL ASSESSMENT:  1 - CLEAR

## 2025-03-11 NOTE — PROGRESS NOTES
"Subjective     Paola Black is a 29 y.o. female who presents for the following: Psoriasis.  She states over the past 2.5 years, she has been developing intermittent red to purplish spots on her arms, legs, thighs, buttocks, back, and abdomen, which then scale and peel and then leave behind light spots.  She denies any other areas of involvement, including her face or neck.    Punch biopsy of a representative lesion on her left medial mid thigh performed at her visit in our office on 10/29/24 revealed \"psoriasiform dermatitis,\" for which the histologic differential diagnosis includes psoriasis and overlap with seborrheic dermatitis.    She was last seen in our office on 11/12/2024, at which time she was prescribed triamcinolone 0.1% cream and a 2-week burst and taper fashion.    Today, the patient states the cream seems to help when she uses it, however, it burns and \"irritates her skin\" when she applies the medication, so she has not been using it and has noticed more red, scaly spots appearing on her arms over the past several weeks.  She denies any new, changing, or concerning skin lesions since her last visit; no bleeding, itching, or burning lesions.      Review of Systems:  No other skin or systemic complaints other than what is documented elsewhere in the note.    The following portions of the chart were reviewed this encounter and updated as appropriate:       Skin Cancer History  No skin cancer on file.    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 and hopes to go back to school to become an LPN; she is originally from [place] and has 3 sons, " and her mother is a patient in our office as well and accompanies her today     Allergies:  Acetaminophen; Bee venom protein (honey bee); Robaxin [methocarbamol]; Latex, natural rubber; and Mushroom    Current Medications / CAM's:    Current Outpatient Medications:     famotidine (Pepcid) 20 mg tablet, TAKE 2 TABLET (ORAL) 2 TIMES PER DAY FOR 6 DAYS, Disp: , Rfl:     cyclobenzaprine (Flexeril) 10 mg tablet, Take 1 tablet (10 mg) by mouth 2 times a day as needed for muscle spasms (Pain) for up to 5 days., Disp: 10 tablet, Rfl: 0    EPINEPHrine 0.3 mg/0.3 mL injection syringe, USE AS DIRECTED IN CASE OF A SEVERE ALLERGIC REACTION, Disp: 0.3 mL, Rfl: 1    ferrous sulfate, 325 mg ferrous sulfate, tablet, Take 1 tablet by mouth once daily. Take with food., Disp: 30 tablet, Rfl: 11    gabapentin (Neurontin) 100 mg capsule, Take 1 capsule (100 mg) by mouth once daily at bedtime., Disp: , Rfl:     gabapentin (Neurontin) 300 mg capsule, Take 1 capsule (300 mg) by mouth once daily at bedtime for 14 days., Disp: 14 capsule, Rfl: 0    ketoconazole (NIZOral) 2 % shampoo, Wash affected areas of scalp 2-3 times weekly as directed, Disp: 120 mL, Rfl: 11    Linzess 72 mcg capsule, TAKE 1 CAPSULE (72 MCG) BY MOUTH ONCE DAILY., Disp: 30 capsule, Rfl: 2    medroxyPROGESTERone 150 mg/mL injection syringe, INJECT 1 (ONE) MILLILITER EVERY 90 DAYS, Disp: , Rfl:     omeprazole (PriLOSEC) 20 mg DR capsule, Take 1 capsule (20 mg) by mouth once daily. Do not crush or chew., Disp: 90 capsule, Rfl: 3    triamcinolone (Kenalog) 0.1 % cream, Apply twice daily to affected areas of body (avoid face, groin, body folds) for 2 weeks, then taper to twice daily on weekends only; repeat every 6-8 weeks as needed for flares, Disp: 80 g, Rfl: 2    triamcinolone (Kenalog) 0.1 % ointment, Apply twice daily to affected areas of body (avoid face, groin, body folds) for 2 weeks, then taper to twice daily on weekends only; repeat every 6-8 weeks as needed for  flares, Disp: 80 g, Rfl: 1     Objective   Well appearing patient in no apparent distress; mood and affect are within normal limits.    A full examination was performed including scalp, face, eyes, ears, nose, lips, neck, chest, axillae, abdomen, back, bilateral upper extremities, and bilateral lower extremities. All findings within normal limits unless otherwise noted below.    Assessment/Plan   1. Psoriasis  On the patient's bilateral upper extremities, mainly her bilateral arms, there are several well-demarcated, erythematous, scaly papules and small plaques with overlying whitish, micaceous scale.  Scattered on her trunk and extremities, there are multiple hypo- and hyperpigmented macules and small patches consistent with post-inflammatory pigmentary changes.    Biopsy-proven psoriasiform dermatitis - trunk and extremities.  Based on the patient's history, recent exam and repeat exam today, and the histologic findings in her recent biopsy, the favored clinico-pathologic diagnosis for these lesions is most likely Psoriasis.  The potentially chronic and intermittently flaring nature of this condition and treatment options were discussed extensively with the patient today.  I spent a great deal of time discussing various treatment options, including topical therapy, UV phototherapy, and systemic therapy, such as with Methotrexate, Otezla, a TNF-alpha inhibitor like Enbrel or Humira, and other biologic medications, such as Stelara, Cosentyx, Taltz, Tremfya, and Skyrizi.  After discussing the risks, benefits, and side effects of each of these options at length, the patient expressed understanding and states she wishes to continue with topical therapy for now and avoid systemic therapy if possible.    Thus, at this time, for her current flare, mainly on her arms, I recommend switching her topical steroid therapy to Triamcinolone 0.1% ointment, which the patient was instructed to apply twice daily to the affected areas  of her body (avoid face, groin, body folds) for the next 2 weeks, followed by taper to twice daily on weekends only for persistent areas and/or maintenance and moisturization with a recommended over-the-counter moisturizing cream, such as Eucerin, or Vaseline twice daily on weekdays; the patient may repeat treatment in a 2-week burst-and-taper fashion every 6-8 weeks as needed for future flares.  The risks, benefits, and side effects of this medication, including possible skin atrophy with overuse of topical steroids, were discussed.  Should she develop a flare of this condition unresponsive to topical therapy in the future, she was instructed to call our office to schedule a return visit for re-evaluation and further treatment considerations if indicated at that time.  She expressed understanding and is in agreement with this plan.    triamcinolone (Kenalog) 0.1 % ointment  Apply twice daily to affected areas of body (avoid face, groin, body folds) for 2 weeks, then taper to twice daily on weekends only; repeat every 6-8 weeks as needed for flares    Related Medications  triamcinolone (Kenalog) 0.1 % cream  Apply twice daily to affected areas of body (avoid face, groin, body folds) for 2 weeks, then taper to twice daily on weekends only; repeat every 6-8 weeks as needed for flares    2. Xerosis cutis  Diffuse generalized dry, scaly skin.    Xerosis.  I emphasized the importance of dry, sensitive skin care, including the use of a mild soap, such as Dove, and frequent and aggressive moisturization, at least twice daily and immediately following showers or baths, with recommended over-the-counter moisturizing creams, such as Eucerin, Cetaphil, Cerave, or Aveeno, or Vaseline or Aquaphor ointments.    3. Diffuse photodamage of skin  Photodistributed  Diffuse photodamage with actinic changes with telangiectasia and mottled pigmentation in sun-exposed areas.    Photodamage.  The signs and symptoms of skin cancer were  reviewed and the patient was advised to practice sun protection and sun avoidance, use daily sunscreen, and perform regular self skin exams.  Sun protection was discussed, including avoiding the mid-day sun, wearing a sunscreen with SPF at least 50, and stressing the need for reapplication of sunscreen and applying more than they think they need.

## 2025-03-17 ENCOUNTER — OFFICE VISIT (OUTPATIENT)
Dept: UROLOGY | Facility: HOSPITAL | Age: 30
End: 2025-03-17
Payer: COMMERCIAL

## 2025-03-17 DIAGNOSIS — Q61.02 MULTIPLE RENAL CYSTS: ICD-10-CM

## 2025-03-17 DIAGNOSIS — R39.9 LOWER URINARY TRACT SYMPTOMS (LUTS): Primary | ICD-10-CM

## 2025-03-17 LAB
POC APPEARANCE, URINE: CLEAR
POC BILIRUBIN, URINE: NEGATIVE
POC BLOOD, URINE: ABNORMAL
POC COLOR, URINE: YELLOW
POC GLUCOSE, URINE: NEGATIVE MG/DL
POC KETONES, URINE: NEGATIVE MG/DL
POC LEUKOCYTES, URINE: ABNORMAL
POC NITRITE,URINE: NEGATIVE
POC PH, URINE: 6 PH
POC PROTEIN, URINE: NEGATIVE MG/DL
POC SPECIFIC GRAVITY, URINE: >=1.03
POC UROBILINOGEN, URINE: 0.2 EU/DL

## 2025-03-17 PROCEDURE — 81003 URINALYSIS AUTO W/O SCOPE: CPT | Performed by: NURSE PRACTITIONER

## 2025-03-17 PROCEDURE — 1036F TOBACCO NON-USER: CPT | Performed by: NURSE PRACTITIONER

## 2025-03-17 PROCEDURE — 99204 OFFICE O/P NEW MOD 45 MIN: CPT | Performed by: NURSE PRACTITIONER

## 2025-03-17 PROCEDURE — 99214 OFFICE O/P EST MOD 30 MIN: CPT | Performed by: NURSE PRACTITIONER

## 2025-03-17 NOTE — PROGRESS NOTES
[facility] [place] [facility]  Outpatient Clinic Note    Patient Name:  Paola Black  MRN:  [id number]  :  1995    Referring Provider: No ref. provider found  Date of Service: 3/17/2025   Visit type: New patient visit     problem list/Chief complaint:  Renal cysts        HISTORY OF PRESENT ILLNESS:  Paola Black is a 29 y.o. female with past medical history of GERD, vitamin D deficiency, iron deficiency anemia, IBS,  who presents for initial Urology visit. I performed a detailed review of the medical chart records lab testing and imaging. Patient referred to Urology for kidney/ureter stone.  Patient is accompanied by her mother and son. She states she was having urinary frequency for a while but now she does urinate very much, maybe once or twice a day. She works 3rd shift, she drinks clear fluids about 48 oz a day. She denies dysuria, no nocturia, no fever or chills. She had an MRI of her spine and was told she has some cysts.    Patient has left flank/abdominal pain, rare nausea, no gross hematuria.   History of kidney stones? no  Family history of kidney stones? Yes  Are you on blood thinners? no      I personally reviewed MR lumbar spine dated 25.   IMPRESSION:  There is mild multilevel lumbar spondylosis as described above.      There are left-sided peripelvic renal cysts and/or mild asymmetric  prominence of the extrarenal collecting system.      PAST MEDICAL HISTORY:  Past Medical History:   Diagnosis Date    Anxiety disorder, unspecified 2020    Anxiety and depression    Body mass index (BMI) 38.0-38.9, adult 2020    BMI 38.0-38.9,adult    Cervicogenic headache 2018    Cervicogenic headache    Changes in skin texture 2020    Changes in skin texture    Chronic rhinitis 2017    Rhinitis    Disorder of the skin and subcutaneous tissue, unspecified 10/22/2018    Skin lesion    Dorsalgia, unspecified 2021    Back pain, acute    Elevated blood-pressure reading, without  diagnosis of hypertension 04/14/2020    Elevated blood pressure reading    Encounter for follow-up examination after completed treatment for conditions other than malignant neoplasm 10/22/2018    Hospital discharge follow-up    Influenza due to other identified influenza virus with other respiratory manifestations 02/05/2020    Influenza A    Maternal care for other (suspected) fetal abnormality and damage, fetal cardiac anomalies, not applicable or unspecified 04/22/2021    Abnormal fetal echocardiogram affecting antepartum care of mother    Other conditions influencing health status 02/05/2020    History of cough    Other conditions influencing health status 01/23/2019    History of chronic diarrhea    Other malaise 12/18/2019    Malaise    Other specified abnormal findings of blood chemistry 04/22/2020    Abnormal CBC    Other specified disorders of teeth and supporting structures 08/28/2018    Pain, dental    Periapical abscess without sinus 08/28/2018    Dental infection    Personal history of other diseases of the digestive system 04/02/2018    History of gastritis    Personal history of other diseases of the digestive system 08/13/2019    History of acute gastritis    Personal history of other diseases of the female genital tract 04/22/2020    History of breast pain    Personal history of other diseases of the musculoskeletal system and connective tissue 04/02/2018    History of neck pain    Personal history of other diseases of the respiratory system 01/30/2019    History of acute bronchitis    Personal history of other endocrine, nutritional and metabolic disease 10/17/2019    History of morbid obesity    Personal history of other mental and behavioral disorders 04/22/2021    History of depression    Personal history of other mental and behavioral disorders 05/19/2020    History of anxiety    Personal history of other specified conditions 08/13/2019    History of fatigue    Personal history of other  specified conditions 08/10/2020    History of epistaxis    Unspecified abdominal pain 08/13/2019    Abdominal pain, acute    Unspecified abdominal pain 08/13/2019    Flank pain, acute       PAST SURGICAL HISTORY:  Past Surgical History:   Procedure Laterality Date    CHOLECYSTECTOMY  04/22/2021    Cholecystectomy       ALLERGIES:  Allergies   Allergen Reactions    Acetaminophen Shortness of breath, Itching, Swelling and Wheezing    Bee Venom Protein (Honey Bee) Anaphylaxis    Robaxin [Methocarbamol] Shortness of breath    Latex, Natural Rubber Swelling and Unknown    Mushroom Swelling       MEDICATIONS:  Current Outpatient Medications   Medication Instructions    cyclobenzaprine (FLEXERIL) 10 mg, oral, 2 times daily PRN    EPINEPHrine 0.3 mg/0.3 mL injection syringe USE AS DIRECTED IN CASE OF A SEVERE ALLERGIC REACTION    famotidine (Pepcid) 20 mg tablet TAKE 2 TABLET (ORAL) 2 TIMES PER DAY FOR 6 DAYS    ferrous sulfate, 325 mg ferrous sulfate, tablet 1 tablet, oral, Daily, Take with food.    ketoconazole (NIZOral) 2 % shampoo Wash affected areas of scalp 2-3 times weekly as directed    Linzess 72 mcg capsule TAKE 1 CAPSULE (72 MCG) BY MOUTH ONCE DAILY.    medroxyPROGESTERone 150 mg/mL injection syringe INJECT 1 (ONE) MILLILITER EVERY 90 DAYS    omeprazole (PRILOSEC) 20 mg, oral, Daily, Do not crush or chew.    triamcinolone (Kenalog) 0.1 % cream Apply twice daily to affected areas of body (avoid face, groin, body folds) for 2 weeks, then taper to twice daily on weekends only; repeat every 6-8 weeks as needed for flares    triamcinolone (Kenalog) 0.1 % ointment Apply twice daily to affected areas of body (avoid face, groin, body folds) for 2 weeks, then taper to twice daily on weekends only; repeat every 6-8 weeks as needed for flares        SOCIAL HISTORY:  Social History     Tobacco Use    Smoking status: Never     Passive exposure: Never    Smokeless tobacco: Never   Vaping Use    Vaping status: Never Used    Substance Use Topics    Alcohol use: Yes     Comment: RARE    Drug use: Never        FAMILY HISTORY:  Family History   Problem Relation Name Age of Onset    Thyroid disease Mother      Gout Father      Colon cancer Paternal Grandfather      Prostate cancer Paternal Grandfather          REVIEW OF SYSTEMS:  10-pt ROS reviewed and negative except as mentioned above.    Vital signs:  There were no vitals taken for this visit.    PHYSICAL EXAMINATION:  General: Appears comfortable and in no apparent distress.  Head: Normocephalic, atraumatic  Eyes: Non-injected conjunctiva, sclera clear, no proptosis  Lungs: Breathing is easy, non-labored. Speaking in clear and complete sentences. Normal diaphragmatic movement.  Cardiovascular: no peripheral edema, cyanosis or pallor.   Abdomen: soft, non-distended, non-tender  : Bladder: non tender, not distended  MSK: Ambulatory with steady gait, unassisted  Skin: No visible rashes or lesions  Neurologic: Alert, oriented to person, place, and time  Psychiatric: mood and affect appropriate      IMAGING DATA:   MR lumbar spine wo IV contrast  Narrative: Interpreted By:  Johan Drummond,   STUDY:  MR LUMBAR SPINE WO IV CONTRAST;  2/18/2025 7:30 am      INDICATION:  Signs/Symptoms: lumbar radiculopathy.      COMPARISON:  None.      ACCESSION NUMBER(S):  [id number]      ORDERING CLINICIAN:  SANTIAGO BENNETT      TECHNIQUE:  Sagittal STIR, sagittal T2, sagittal T1, axial T2, axial T1 weighted  MRI images of the lumbar spine were obtained without intravenous  contrast administration.      FINDINGS:  The lumbar vertebral bodies are in anatomic alignment.      No focal bone marrow signal abnormality is noted.      The visualized spinal cord demonstrates no signal abnormality within  it. The conus medullaris terminates at the L1/2 level.      There is diminished disc signal at the L4/5, L3/4, and L2/3 levels  compatible with degenerative disc disease.      At the L5/S1 level,  there are mild  degenerative facet changes  without significant spinal canal or neural foraminal narrowing.      At the L4/L5 level,  there is a minimal posterior disc bulge and mild  degenerative facet changes contributing to very mild encroachment  upon the spinal canal. There is no significant neural foraminal  narrowing.      At the L3/L4 level,  there is a minimal posterior disc bulge and mild  degenerative facet changes contributing to very mild encroachment  upon the spinal canal. There is no significant neural foraminal  narrowing.      At the L2/L3 level,  there is a mild posterior disc bulge  contributing to mild encroachment upon the spinal canal. There is no  significant neural foraminal narrowing.      At the L1/L2 level,  there is no significant spinal canal stenosis or  neuroforaminal stenosis.      At the T12/L1 level,  there is no significant spinal canal stenosis  or neuroforaminal stenosis.      There are left-sided peripelvic renal cysts and/or mild asymmetric  prominence of the extrarenal collecting system.      Impression: There is mild multilevel lumbar spondylosis as described above.      There are left-sided peripelvic renal cysts and/or mild asymmetric  prominence of the extrarenal collecting system.      MACRO:  None.      Signed by: Johan Drummond 2/19/2025 8:00 AM  Dictation workstation:   [id number]      LABORATORY DATA:    Lab Results   Component Value Date    WBC 12.6 (H) 05/01/2024    HGB 12.6 05/01/2024    HCT 39.9 05/01/2024    MCV 86 05/01/2024     05/01/2024     Lab Results   Component Value Date    GLUCOSE 85 05/01/2024    CALCIUM 9.3 05/01/2024     05/01/2024    K 3.4 (L) 05/01/2024    CO2 21 05/01/2024     05/01/2024    BUN 13 05/01/2024    CREATININE 0.72 05/01/2024     Office Visit on 03/17/2025   Component Date Value Ref Range Status    POC Color, Urine 03/17/2025 Yellow  Straw, Yellow, Light-Yellow Final    POC Appearance, Urine 03/17/2025 Clear  Clear Final    POC  Glucose, Urine 03/17/2025 NEGATIVE  NEGATIVE mg/dl Final    POC Bilirubin, Urine 03/17/2025 NEGATIVE  NEGATIVE Final    POC Ketones, Urine 03/17/2025 NEGATIVE  NEGATIVE mg/dl Final    POC Specific Gravity, Urine 03/17/2025 >=1.030  1.005 - 1.035 Final    POC Blood, Urine 03/17/2025 TRACE-Intact (A)  NEGATIVE Final    POC PH, Urine 03/17/2025 6.0  No Reference Range Established PH Final    POC Protein, Urine 03/17/2025 NEGATIVE  NEGATIVE mg/dl Final    POC Urobilinogen, Urine 03/17/2025 0.2  0.2, 1.0 EU/DL Final    Poc Nitrite, Urine 03/17/2025 NEGATIVE  NEGATIVE Final    POC Leukocytes, Urine 03/17/2025 TRACE (A)  NEGATIVE Final       ASSESSMENT:  Paola Black is a 29 y.o. female with renal cysts, kidney/ureter stone, who presents for initial Urology visit.    We discussed that simple renal cysts are benign findings and do not require intervention unless they are causing discomfort    PLAN:  -Reviewed MRI results with patient and her family  -UA today with trace leukocytes, trace intact blood. Urine culture and sensitivity ordered to rule out infection. Will call patient if results are positive  -CT urogram ordered for further evaluation of renal cysts, asymmetric prominence of extrarenal collecting system and to rule out kidney stones  -Follow up after CT scan, or sooner if needed    All questions and concerns were addressed. Patient verbalizes understanding and has no other questions at this time.     E&M visit today is associated with current or anticipated ongoing medical care services related to a patient's single, serious condition or a complex condition.    HUY Solitario-CNP  [facility] [place]  3/17/2025

## 2025-03-19 LAB — BACTERIA UR CULT: NORMAL

## 2025-03-26 ENCOUNTER — HOSPITAL ENCOUNTER (OUTPATIENT)
Dept: RADIOLOGY | Facility: HOSPITAL | Age: 30
Discharge: HOME | End: 2025-03-26
Payer: COMMERCIAL

## 2025-03-26 DIAGNOSIS — Q61.02 MULTIPLE RENAL CYSTS: ICD-10-CM

## 2025-03-26 PROCEDURE — 2550000001 HC RX 255 CONTRASTS: Performed by: NURSE PRACTITIONER

## 2025-03-26 PROCEDURE — 74178 CT ABD&PLV WO CNTR FLWD CNTR: CPT

## 2025-03-26 RX ADMIN — IOHEXOL 75 ML: 350 INJECTION, SOLUTION INTRAVENOUS at 09:13

## 2025-03-28 ENCOUNTER — APPOINTMENT (OUTPATIENT)
Dept: NEUROLOGY | Facility: CLINIC | Age: 30
End: 2025-03-28
Payer: COMMERCIAL

## 2025-03-31 ENCOUNTER — OFFICE VISIT (OUTPATIENT)
Dept: UROLOGY | Facility: HOSPITAL | Age: 30
End: 2025-03-31
Payer: COMMERCIAL

## 2025-03-31 DIAGNOSIS — Q61.02 MULTIPLE RENAL CYSTS: Primary | ICD-10-CM

## 2025-03-31 PROCEDURE — 1036F TOBACCO NON-USER: CPT | Performed by: NURSE PRACTITIONER

## 2025-03-31 PROCEDURE — 99214 OFFICE O/P EST MOD 30 MIN: CPT | Performed by: NURSE PRACTITIONER

## 2025-03-31 NOTE — PROGRESS NOTES
[facility] [place]  Outpatient Clinic Note    Patient Name:  Paola Black  MRN:  [id number]  :  1995  Date of Service: 3/31/2025     Visit type: Follow up visit    HPI    Interval History:  Paola Black is a 29 y.o. female with past medical history of GERD, vitamin D deficiency, iron deficiency anemia, IBS, who is being seen today for  problems listed below.     Problem list/Chief complaints:  Multiple left renal cysts     3/17/25: NPV. Patient referred to Urology for kidney/ureter stone.  Patient is accompanied by her mother and son. She states she was having urinary frequency for a while but now she does urinate very much, maybe once or twice a day. She works 3rd shift, she drinks clear fluids about 48 oz a day. She denies dysuria, no nocturia, no fever or chills. She had an MRI of her spine and was told she has some cysts.  CT urogram ordered for further evaluation of renal cysts, asymmetric prominence of extrarenal collecting system and to rule out kidney stones     Patient has left flank/abdominal pain, rare nausea, no gross hematuria.   History of kidney stones? no  Family history of kidney stones? Yes  Are you on blood thinners? no     3/31/25: Patient presents for follow up after CT scan. Patient states she is still experiencing left back pain with occasional right back pain. She denies hematuria, no other urinary issues, no fever or chills.     Past Medical History:   Diagnosis Date    Anxiety disorder, unspecified 2020    Anxiety and depression    Body mass index (BMI) 38.0-38.9, adult 2020    BMI 38.0-38.9,adult    Cervicogenic headache 2018    Cervicogenic headache    Changes in skin texture 2020    Changes in skin texture    Chronic rhinitis 2017    Rhinitis    Disorder of the skin and subcutaneous tissue, unspecified 10/22/2018    Skin lesion    Dorsalgia, unspecified 2021    Back pain, acute    Elevated blood-pressure reading, without diagnosis of  hypertension 04/14/2020    Elevated blood pressure reading    Encounter for follow-up examination after completed treatment for conditions other than malignant neoplasm 10/22/2018    Hospital discharge follow-up    Influenza due to other identified influenza virus with other respiratory manifestations 02/05/2020    Influenza A    Maternal care for other (suspected) fetal abnormality and damage, fetal cardiac anomalies, not applicable or unspecified 04/22/2021    Abnormal fetal echocardiogram affecting antepartum care of mother    Other conditions influencing health status 02/05/2020    History of cough    Other conditions influencing health status 01/23/2019    History of chronic diarrhea    Other malaise 12/18/2019    Malaise    Other specified abnormal findings of blood chemistry 04/22/2020    Abnormal CBC    Other specified disorders of teeth and supporting structures 08/28/2018    Pain, dental    Periapical abscess without sinus 08/28/2018    Dental infection    Personal history of other diseases of the digestive system 04/02/2018    History of gastritis    Personal history of other diseases of the digestive system 08/13/2019    History of acute gastritis    Personal history of other diseases of the female genital tract 04/22/2020    History of breast pain    Personal history of other diseases of the musculoskeletal system and connective tissue 04/02/2018    History of neck pain    Personal history of other diseases of the respiratory system 01/30/2019    History of acute bronchitis    Personal history of other endocrine, nutritional and metabolic disease 10/17/2019    History of morbid obesity    Personal history of other mental and behavioral disorders 04/22/2021    History of depression    Personal history of other mental and behavioral disorders 05/19/2020    History of anxiety    Personal history of other specified conditions 08/13/2019    History of fatigue    Personal history of other specified conditions  08/10/2020    History of epistaxis    Unspecified abdominal pain 08/13/2019    Abdominal pain, acute    Unspecified abdominal pain 08/13/2019    Flank pain, acute       Past Surgical History:   Procedure Laterality Date    CHOLECYSTECTOMY  04/22/2021    Cholecystectomy       Social History     Socioeconomic History    Marital status: Single     Spouse name: Not on file    Number of children: Not on file    Years of education: Not on file    Highest education level: Not on file   Occupational History    Not on file   Tobacco Use    Smoking status: Never     Passive exposure: Never    Smokeless tobacco: Never   Vaping Use    Vaping status: Never Used   Substance and Sexual Activity    Alcohol use: Yes     Comment: RARE    Drug use: Never    Sexual activity: Defer   Other Topics Concern    Not on file   Social History Narrative    Not on file     Social Drivers of Health     Financial Resource Strain: Low Risk  (3/29/2024)    Overall Financial Resource Strain (CARDIA)     Difficulty of Paying Living Expenses: Not hard at all   Food Insecurity: Not on file   Transportation Needs: No Transportation Needs (3/29/2024)    PRAPARE - Transportation     Lack of Transportation (Medical): No     Lack of Transportation (Non-Medical): No   Physical Activity: Not on file   Stress: Not on file   Social Connections: Not on file   Intimate Partner Violence: Not on file   Housing Stability: Low Risk  (3/29/2024)    Housing Stability Vital Sign     Unable to Pay for Housing in the Last Year: No     Number of Places Lived in the Last Year: 1     Unstable Housing in the Last Year: No       Allergies   Allergen Reactions    Acetaminophen Shortness of breath, Itching, Swelling and Wheezing    Bee Venom Protein (Honey Bee) Anaphylaxis    Robaxin [Methocarbamol] Shortness of breath    Latex, Natural Rubber Swelling and Unknown    Mushroom Swelling          Current Outpatient Medications:     cyclobenzaprine (Flexeril) 10 mg tablet, Take 1  tablet (10 mg) by mouth 2 times a day as needed for muscle spasms (Pain) for up to 5 days., Disp: 10 tablet, Rfl: 0    EPINEPHrine 0.3 mg/0.3 mL injection syringe, USE AS DIRECTED IN CASE OF A SEVERE ALLERGIC REACTION, Disp: 0.3 mL, Rfl: 1    famotidine (Pepcid) 20 mg tablet, TAKE 2 TABLET (ORAL) 2 TIMES PER DAY FOR 6 DAYS, Disp: , Rfl:     ferrous sulfate, 325 mg ferrous sulfate, tablet, Take 1 tablet by mouth once daily. Take with food., Disp: 30 tablet, Rfl: 11    ketoconazole (NIZOral) 2 % shampoo, Wash affected areas of scalp 2-3 times weekly as directed, Disp: 120 mL, Rfl: 11    Linzess 72 mcg capsule, TAKE 1 CAPSULE (72 MCG) BY MOUTH ONCE DAILY., Disp: 30 capsule, Rfl: 2    medroxyPROGESTERone 150 mg/mL injection syringe, INJECT 1 (ONE) MILLILITER EVERY 90 DAYS, Disp: , Rfl:     omeprazole (PriLOSEC) 20 mg DR capsule, Take 1 capsule (20 mg) by mouth once daily. Do not crush or chew., Disp: 90 capsule, Rfl: 3    triamcinolone (Kenalog) 0.1 % cream, Apply twice daily to affected areas of body (avoid face, groin, body folds) for 2 weeks, then taper to twice daily on weekends only; repeat every 6-8 weeks as needed for flares, Disp: 80 g, Rfl: 2    triamcinolone (Kenalog) 0.1 % ointment, Apply twice daily to affected areas of body (avoid face, groin, body folds) for 2 weeks, then taper to twice daily on weekends only; repeat every 6-8 weeks as needed for flares, Disp: 80 g, Rfl: 1     Review of system:  All other systems have been reviewed and are negative for complaints      Last recorded vitals:  Last menstrual period 03/26/2025.    Physical Exam:  General: Appears comfortable and in no apparent distress.  Head: Normocephalic, atraumatic  Eyes: Non-injected conjunctiva, sclera clear, no proptosis  Lungs: Breathing is easy, non-labored. Speaking in clear and complete sentences. Normal diaphragmatic movement.  Cardiovascular: no peripheral edema, cyanosis or pallor.   Abdomen: soft, non-distended, non-tender  :  Bladder: non tender, not distended  MSK: Ambulatory with steady gait, unassisted  Skin: No visible rashes or lesions  Neurologic: Alert, oriented to person, place, and time  Psychiatric: mood and affect appropriate      Imaging  CT urography w 3D volume rendered imaging  Narrative: Interpreted By:  Aleksander Trimble,   STUDY:  CT UROGRAPHY WITH 3D VOLUME RENDERED IMAGING;  3/26/2025 9:30 am      INDICATION:  Signs/Symptoms:recent scan with left sided cysts/asymmetric  collecting systems.      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  TRIPLE PHASE TECHNIQUE:  Contiguous axial images of the abdomen and pelvis were obtained at  3mm slice thickness before, 110sec and 10min after intravenous  contrast administration. Oral ingestion of up to 900ml or water was  encouraged prior to the study. Multiplanar reformats in coronal and  sagittal reconstructions at 3 mm slice thickness were performed. 3D  reconstructions were performed on an independent workstation and  provided for review.  75 ML of Omnipaque 350 was administered  intravenously without immediate complication.      FINDINGS:  LOWER CHEST:  Mild bibasilar atelectasis. 5 mm right lower lobe subsolid nodule  (series 304, image 37).      ABDOMEN:      KIDNEYS AND URETERS:  Right kidney and ureter:  No suspicious mass or abnormal focus of enhancement. No calculi or  hydronephrosis. Normal contrast opacification of the ureter.      Left kidney and ureter:  No suspicious mass or abnormal focus of enhancement. No calculi or  hydronephrosis. Multiple parapelvic cysts. Normal contrast  opacification of the ureter.      BLADDER:  Urinary bladder is unremarkable.      LIVER:  Imaged liver demonstrates homogeneous attenuation without focal mass.      BILE DUCTS:  The bile ducts are not  dilated.      GALLBLADDER:  Cholecystectomy      PANCREAS:  The pancreas appears unremarkable.      SPLEEN:  Within normal limits.      ADRENAL GLANDS:  Bilateral adrenal glands appear normal.      REPRODUCTIVE ORGANS:  No pelvic mass. A tampon is noted.      BOWEL:  The stomach is unremarkable.  The small and large bowel are normal in  caliber and demonstrate no wall thickening.  The appendix is not  definitely visualized. There is however no pericecal stranding or  fluid.      VESSELS:  There is no aneurysmal dilatation of the abdominal aorta. The IVC is  within normal limits.      PERITONEUM/RETROPERITONEUM/LYMPH NODES:  There is no free or loculated fluid collection, no free  intraperitoneal air.  The retroperitoneum appears unremarkable.  No  abdominopelvic lymphadenopathy is present.      ABDOMINAL WALL:  Small fat containing umbilical hernia.      BONE AND SOFT TISSUE:  No suspicious osseous lesions are identified.      Impression: 1.  Multiple left parapelvic cysts. Otherwise unremarkable left renal  collecting system. No suspicious renal masses, calculi or  hydronephrosis.          MACRO:  None      Signed by: Aleksander Trimble 3/27/2025 9:46 AM  Dictation workstation:   [id number]      Labs  Orders Only on 03/17/2025   Component Date Value    CULTURE, URINE, ROUTINE 03/17/2025 SEE NOTE        Assessment and Plan:  Paola Black is a 29 y.o. female with history of renal cysts, who presents for follow up after CT urogram.     We discussed that simple renal cysts are benign findings and do not require intervention unless they are causing discomfort    Plan:  -Reviewed CT urogram results with patient  -Discussed that back pain could be musculoskeletal in nature  -Patient to follow up with PCP regarding right lung nodule  -Repeat CT AP in 6 months for surveillance of cysts  -Follow-up in 6 months, or sooner if needed, to reassess symptoms and for medication refill.    All questions and concerns were  addressed. Patient verbalizes understanding and has no other questions at this time.     Some elements copied from my note on 3/17/25, the elements have been updated and all reflect current decision making from today, 03/31/25    E&M visit today is associated with current or anticipated ongoing medical care services related to a patient's single, serious condition or a complex condition.    HUY Solitario-CNP   [facility] [place]  03/31/25 1:41 PM

## 2025-04-01 LAB
ALBUMIN SERPL-MCNC: 4.3 G/DL (ref 3.6–5.1)
ALP SERPL-CCNC: 110 U/L (ref 31–125)
ALT SERPL-CCNC: 26 U/L (ref 6–29)
ANION GAP SERPL CALCULATED.4IONS-SCNC: 7 MMOL/L (CALC) (ref 7–17)
AST SERPL-CCNC: 25 U/L (ref 10–30)
BILIRUB SERPL-MCNC: 0.7 MG/DL (ref 0.2–1.2)
BUN SERPL-MCNC: 13 MG/DL (ref 7–25)
CALCIUM SERPL-MCNC: 8.9 MG/DL (ref 8.6–10.2)
CHLORIDE SERPL-SCNC: 105 MMOL/L (ref 98–110)
CO2 SERPL-SCNC: 27 MMOL/L (ref 20–32)
CREAT SERPL-MCNC: 0.63 MG/DL (ref 0.5–0.97)
EGFRCR SERPLBLD CKD-EPI 2021: 122 ML/MIN/1.73M2
GLUCOSE SERPL-MCNC: 77 MG/DL (ref 65–99)
POTASSIUM SERPL-SCNC: 4.1 MMOL/L (ref 3.5–5.3)
PROT SERPL-MCNC: 7.1 G/DL (ref 6.1–8.1)
SODIUM SERPL-SCNC: 139 MMOL/L (ref 135–146)

## 2025-06-12 ENCOUNTER — APPOINTMENT (OUTPATIENT)
Dept: PULMONOLOGY | Facility: HOSPITAL | Age: 30
End: 2025-06-12
Payer: COMMERCIAL

## 2025-06-14 ENCOUNTER — HOSPITAL ENCOUNTER (EMERGENCY)
Facility: HOSPITAL | Age: 30
Discharge: HOME | End: 2025-06-14
Attending: STUDENT IN AN ORGANIZED HEALTH CARE EDUCATION/TRAINING PROGRAM
Payer: COMMERCIAL

## 2025-06-14 ENCOUNTER — APPOINTMENT (OUTPATIENT)
Dept: RADIOLOGY | Facility: HOSPITAL | Age: 30
End: 2025-06-14
Payer: COMMERCIAL

## 2025-06-14 VITALS
HEART RATE: 76 BPM | BODY MASS INDEX: 43.39 KG/M2 | RESPIRATION RATE: 15 BRPM | HEIGHT: 66 IN | TEMPERATURE: 96.8 F | DIASTOLIC BLOOD PRESSURE: 66 MMHG | WEIGHT: 270 LBS | OXYGEN SATURATION: 100 % | SYSTOLIC BLOOD PRESSURE: 121 MMHG

## 2025-06-14 DIAGNOSIS — R10.31 ABDOMINAL WALL PAIN IN RIGHT LOWER QUADRANT: Primary | ICD-10-CM

## 2025-06-14 DIAGNOSIS — N30.00 ACUTE CYSTITIS WITHOUT HEMATURIA: ICD-10-CM

## 2025-06-14 LAB
ALBUMIN SERPL BCP-MCNC: 4.1 G/DL (ref 3.4–5)
ALP SERPL-CCNC: 100 U/L (ref 33–110)
ALT SERPL W P-5'-P-CCNC: 47 U/L (ref 7–45)
ANION GAP SERPL CALC-SCNC: 15 MMOL/L (ref 10–20)
APPEARANCE UR: ABNORMAL
AST SERPL W P-5'-P-CCNC: 38 U/L (ref 9–39)
BACTERIA #/AREA URNS AUTO: ABNORMAL /HPF
BASOPHILS # BLD AUTO: 0.08 X10*3/UL (ref 0–0.1)
BASOPHILS NFR BLD AUTO: 0.8 %
BILIRUB SERPL-MCNC: 0.4 MG/DL (ref 0–1.2)
BILIRUB UR STRIP.AUTO-MCNC: NEGATIVE MG/DL
BUN SERPL-MCNC: 16 MG/DL (ref 6–23)
CALCIUM SERPL-MCNC: 8.8 MG/DL (ref 8.6–10.3)
CHLORIDE SERPL-SCNC: 103 MMOL/L (ref 98–107)
CO2 SERPL-SCNC: 24 MMOL/L (ref 21–32)
COLOR UR: YELLOW
CREAT SERPL-MCNC: 0.8 MG/DL (ref 0.5–1.05)
EGFRCR SERPLBLD CKD-EPI 2021: >90 ML/MIN/1.73M*2
EOSINOPHIL # BLD AUTO: 0.17 X10*3/UL (ref 0–0.7)
EOSINOPHIL NFR BLD AUTO: 1.6 %
ERYTHROCYTE [DISTWIDTH] IN BLOOD BY AUTOMATED COUNT: 14.6 % (ref 11.5–14.5)
GLUCOSE SERPL-MCNC: 77 MG/DL (ref 74–99)
GLUCOSE UR STRIP.AUTO-MCNC: NORMAL MG/DL
HCG UR QL IA.RAPID: NEGATIVE
HCT VFR BLD AUTO: 40 % (ref 36–46)
HGB BLD-MCNC: 12.7 G/DL (ref 12–16)
HOLD SPECIMEN: NORMAL
IMM GRANULOCYTES # BLD AUTO: 0.04 X10*3/UL (ref 0–0.7)
IMM GRANULOCYTES NFR BLD AUTO: 0.4 % (ref 0–0.9)
KETONES UR STRIP.AUTO-MCNC: NEGATIVE MG/DL
LEUKOCYTE ESTERASE UR QL STRIP.AUTO: ABNORMAL
LIPASE SERPL-CCNC: 23 U/L (ref 9–82)
LYMPHOCYTES # BLD AUTO: 3.34 X10*3/UL (ref 1.2–4.8)
LYMPHOCYTES NFR BLD AUTO: 32.3 %
MCH RBC QN AUTO: 28.2 PG (ref 26–34)
MCHC RBC AUTO-ENTMCNC: 31.8 G/DL (ref 32–36)
MCV RBC AUTO: 89 FL (ref 80–100)
MONOCYTES # BLD AUTO: 0.62 X10*3/UL (ref 0.1–1)
MONOCYTES NFR BLD AUTO: 6 %
MUCOUS THREADS #/AREA URNS AUTO: ABNORMAL /LPF
NEUTROPHILS # BLD AUTO: 6.08 X10*3/UL (ref 1.2–7.7)
NEUTROPHILS NFR BLD AUTO: 58.9 %
NITRITE UR QL STRIP.AUTO: NEGATIVE
NRBC BLD-RTO: 0 /100 WBCS (ref 0–0)
PH UR STRIP.AUTO: 5.5 [PH]
PLATELET # BLD AUTO: 350 X10*3/UL (ref 150–450)
POTASSIUM SERPL-SCNC: 3.6 MMOL/L (ref 3.5–5.3)
PROT SERPL-MCNC: 7.4 G/DL (ref 6.4–8.2)
PROT UR STRIP.AUTO-MCNC: NEGATIVE MG/DL
RBC # BLD AUTO: 4.5 X10*6/UL (ref 4–5.2)
RBC # UR STRIP.AUTO: ABNORMAL MG/DL
RBC #/AREA URNS AUTO: ABNORMAL /HPF
SODIUM SERPL-SCNC: 138 MMOL/L (ref 136–145)
SP GR UR STRIP.AUTO: 1.03
SQUAMOUS #/AREA URNS AUTO: ABNORMAL /HPF
UROBILINOGEN UR STRIP.AUTO-MCNC: NORMAL MG/DL
WBC # BLD AUTO: 10.3 X10*3/UL (ref 4.4–11.3)
WBC #/AREA URNS AUTO: ABNORMAL /HPF
WBC CLUMPS #/AREA URNS AUTO: ABNORMAL /HPF

## 2025-06-14 PROCEDURE — 85025 COMPLETE CBC W/AUTO DIFF WBC: CPT | Performed by: STUDENT IN AN ORGANIZED HEALTH CARE EDUCATION/TRAINING PROGRAM

## 2025-06-14 PROCEDURE — 83690 ASSAY OF LIPASE: CPT | Performed by: STUDENT IN AN ORGANIZED HEALTH CARE EDUCATION/TRAINING PROGRAM

## 2025-06-14 PROCEDURE — 2500000005 HC RX 250 GENERAL PHARMACY W/O HCPCS: Performed by: STUDENT IN AN ORGANIZED HEALTH CARE EDUCATION/TRAINING PROGRAM

## 2025-06-14 PROCEDURE — 96375 TX/PRO/DX INJ NEW DRUG ADDON: CPT

## 2025-06-14 PROCEDURE — 2500000004 HC RX 250 GENERAL PHARMACY W/ HCPCS (ALT 636 FOR OP/ED): Mod: JZ | Performed by: STUDENT IN AN ORGANIZED HEALTH CARE EDUCATION/TRAINING PROGRAM

## 2025-06-14 PROCEDURE — 80053 COMPREHEN METABOLIC PANEL: CPT | Performed by: STUDENT IN AN ORGANIZED HEALTH CARE EDUCATION/TRAINING PROGRAM

## 2025-06-14 PROCEDURE — 74177 CT ABD & PELVIS W/CONTRAST: CPT | Mod: FOREIGN READ | Performed by: RADIOLOGY

## 2025-06-14 PROCEDURE — 2550000001 HC RX 255 CONTRASTS: Performed by: STUDENT IN AN ORGANIZED HEALTH CARE EDUCATION/TRAINING PROGRAM

## 2025-06-14 PROCEDURE — 99285 EMERGENCY DEPT VISIT HI MDM: CPT | Mod: 25 | Performed by: STUDENT IN AN ORGANIZED HEALTH CARE EDUCATION/TRAINING PROGRAM

## 2025-06-14 PROCEDURE — 96374 THER/PROPH/DIAG INJ IV PUSH: CPT

## 2025-06-14 PROCEDURE — 81001 URINALYSIS AUTO W/SCOPE: CPT | Performed by: STUDENT IN AN ORGANIZED HEALTH CARE EDUCATION/TRAINING PROGRAM

## 2025-06-14 PROCEDURE — 81025 URINE PREGNANCY TEST: CPT | Performed by: STUDENT IN AN ORGANIZED HEALTH CARE EDUCATION/TRAINING PROGRAM

## 2025-06-14 PROCEDURE — 87086 URINE CULTURE/COLONY COUNT: CPT | Mod: GEALAB | Performed by: STUDENT IN AN ORGANIZED HEALTH CARE EDUCATION/TRAINING PROGRAM

## 2025-06-14 PROCEDURE — 74177 CT ABD & PELVIS W/CONTRAST: CPT

## 2025-06-14 PROCEDURE — 36415 COLL VENOUS BLD VENIPUNCTURE: CPT | Performed by: STUDENT IN AN ORGANIZED HEALTH CARE EDUCATION/TRAINING PROGRAM

## 2025-06-14 RX ORDER — KETOROLAC TROMETHAMINE 15 MG/ML
15 INJECTION, SOLUTION INTRAMUSCULAR; INTRAVENOUS ONCE
Status: COMPLETED | OUTPATIENT
Start: 2025-06-14 | End: 2025-06-14

## 2025-06-14 RX ORDER — IBUPROFEN 200 MG
400 TABLET ORAL EVERY 6 HOURS PRN
Qty: 100 TABLET | Refills: 0 | Status: SHIPPED | OUTPATIENT
Start: 2025-06-14

## 2025-06-14 RX ORDER — ORPHENADRINE CITRATE 30 MG/ML
60 INJECTION INTRAMUSCULAR; INTRAVENOUS ONCE
Status: COMPLETED | OUTPATIENT
Start: 2025-06-14 | End: 2025-06-14

## 2025-06-14 RX ORDER — LIDOCAINE 560 MG/1
1 PATCH PERCUTANEOUS; TOPICAL; TRANSDERMAL ONCE
Status: DISCONTINUED | OUTPATIENT
Start: 2025-06-14 | End: 2025-06-14 | Stop reason: HOSPADM

## 2025-06-14 RX ORDER — LIDOCAINE 560 MG/1
1 PATCH PERCUTANEOUS; TOPICAL; TRANSDERMAL DAILY
Qty: 20 PATCH | Refills: 0 | Status: SHIPPED | OUTPATIENT
Start: 2025-06-14

## 2025-06-14 RX ORDER — CYCLOBENZAPRINE HCL 5 MG
5 TABLET ORAL 3 TIMES DAILY PRN
Qty: 9 TABLET | Refills: 0 | Status: SHIPPED | OUTPATIENT
Start: 2025-06-14 | End: 2025-06-17

## 2025-06-14 RX ORDER — CEPHALEXIN 500 MG/1
500 CAPSULE ORAL 2 TIMES DAILY
Qty: 10 CAPSULE | Refills: 0 | Status: SHIPPED | OUTPATIENT
Start: 2025-06-14 | End: 2025-06-16

## 2025-06-14 RX ORDER — ONDANSETRON 4 MG/1
4 TABLET, ORALLY DISINTEGRATING ORAL ONCE
Status: DISCONTINUED | OUTPATIENT
Start: 2025-06-14 | End: 2025-06-14 | Stop reason: HOSPADM

## 2025-06-14 RX ADMIN — ORPHENADRINE CITRATE 60 MG: 60 INJECTION INTRAMUSCULAR; INTRAVENOUS at 06:46

## 2025-06-14 RX ADMIN — LIDOCAINE 4% 1 PATCH: 40 PATCH TOPICAL at 06:46

## 2025-06-14 RX ADMIN — IOHEXOL 75 ML: 350 INJECTION, SOLUTION INTRAVENOUS at 07:42

## 2025-06-14 RX ADMIN — KETOROLAC TROMETHAMINE 15 MG: 15 INJECTION, SOLUTION INTRAMUSCULAR; INTRAVENOUS at 06:46

## 2025-06-14 ASSESSMENT — PAIN SCALES - GENERAL
PAINLEVEL_OUTOF10: 1
PAINLEVEL_OUTOF10: 9

## 2025-06-14 ASSESSMENT — PAIN DESCRIPTION - PAIN TYPE: TYPE: ACUTE PAIN

## 2025-06-14 ASSESSMENT — PAIN DESCRIPTION - DESCRIPTORS: DESCRIPTORS: SHARP;STABBING

## 2025-06-14 ASSESSMENT — LIFESTYLE VARIABLES
EVER HAD A DRINK FIRST THING IN THE MORNING TO STEADY YOUR NERVES TO GET RID OF A HANGOVER: NO
HAVE PEOPLE ANNOYED YOU BY CRITICIZING YOUR DRINKING: NO
EVER FELT BAD OR GUILTY ABOUT YOUR DRINKING: NO
TOTAL SCORE: 0
HAVE YOU EVER FELT YOU SHOULD CUT DOWN ON YOUR DRINKING: NO

## 2025-06-14 ASSESSMENT — PAIN DESCRIPTION - LOCATION: LOCATION: ABDOMEN

## 2025-06-14 ASSESSMENT — PAIN - FUNCTIONAL ASSESSMENT: PAIN_FUNCTIONAL_ASSESSMENT: 0-10

## 2025-06-14 ASSESSMENT — PAIN DESCRIPTION - FREQUENCY: FREQUENCY: CONSTANT/CONTINUOUS

## 2025-06-14 ASSESSMENT — PAIN DESCRIPTION - ORIENTATION: ORIENTATION: RIGHT;LOWER

## 2025-06-14 NOTE — ED PROVIDER NOTES
Emergency Department Provider Note       History of Present Illness     History provided by: Patient  Limitations to History: None  External Records Reviewed with Brief Summary: None    HPI:  History of Present Illness  The patient presents to the ER for abdominal pain, accompanied by her mother.    She reports sharp, stabbing abdominal pain at work ([employer]) while lifting a heavy box. The pain intensified as she walked back to her cart. Stretching exacerbated the pain. She also reports nausea, but no chest pain, shortness of breath, urinary issues, or possibility of pregnancy.     SOCIAL HISTORY  - Does not smoke  - Does not drink  - Does not use drugs    ALLERGIES  - Allergic to ACETAMINOPHEN: experiences throat closure and difficulty breathing  - Allergic to METHOCARBAMOL    MEDICATIONS  - Multivitamin        Physical Exam   Triage vitals:  T 36 °C (96.8 °F)  HR 76  /73  RR 18  O2 100 % None (Room air)    General: Awake, alert, in no acute distress  Eyes: Gaze conjugate.  No scleral icterus or injection  HENT: Normo-cephalic, atraumatic. No stridor  CV: Regular rate, regular rhythm. Radial pulses 2+ bilaterally  Resp: Breathing non-labored, speaking in full sentences.  Clear to auscultation bilaterally  GI: Soft, protuberant, focally tender in the right lower quadrant. No rebound or guarding.  No palpable masses, no hernia palpable  MSK/Extremities: No gross bony deformities. Moving all extremities  Skin: Warm. Appropriate color  Neuro: Alert. Oriented. Face symmetric. Speech is fluent.  Gross strength and sensation intact in b/l UE and LEs  Psych: Appropriate mood and affect      Medical Decision Making & ED Course   Medical Decision Makin y.o. female presenting to the emergency department with right lower quadrant pain that started at work after lifting a heavy box.  On arrival, vital signs within normal limits.  Exam is fairly benign.  History and physical exam are concerning most for potential  abdominal wall muscle strain versus hernia which is already reduced.  She is no palpable defect, no palpable bulge which is reassuring.  I do not feel she requires labs at this time.  Will send UA given her tenderness over the right lower quadrant as well as a pregnancy test to ensure there is no risk for ectopic pregnancy.  Will treat pain multimodal he would Toradol, muscle relaxer, lidocaine patch.  Will treat her nausea with Zofran.  If symptoms are not controlled with this therapy, we will proceed to CT.    For the remainder of the patient's ED course and medical decision making, please see updates in the ED course section below.    ----      Differential diagnoses considered include but are not limited to: Abdominal wall strain, hernia, ectopic pregnancy, UTI.  Lower suspicion for appendicitis given onset during lifting exercise    Social Determinants of Health which Significantly Impact Care: Social Determinants of Health which Significantly Impact Care: None identified     EKG Independent Interpretation: EKG not obtained    Independent Result Review and Interpretation: Relevant laboratory and radiographic results were reviewed and independently interpreted by myself.  As necessary, they are commented on in the ED Course.    Chronic conditions affecting the patient's care: None    The patient was discussed with the following consultants/services: None    Care Considerations: None    ED Course:  ED Course as of 06/14/25 0929   Sat Jun 14, 2025   0725 Urinalysis with Reflex Culture and Microscopic(!)  UA with 250 leukoesterase negative nitrites but significant amounts of white blood cells and rare clumps as well as 1+ bacteria.  She does have few epithelial squamous (see 10-25), this could be contamination. [SH]   0725 Patient reevaluated, still having right lower quadrant focal tenderness with slight voluntary guarding.  Will obtain CT abdomen pelvis and labs at this time. [SH]   0828 Patient feeling better,  endorses she does have urinary frequency.  Will prescribe Keflex for UTI.  Will discharge her home.  Provided return precautions. [SH]      ED Course User Index  [SH] Rom Alvarenga MD         Diagnoses as of 06/14/25 0929   Abdominal wall pain in right lower quadrant   Acute cystitis without hematuria       Disposition   As a result of the work-up, the patient was discharged home.  she was informed of her diagnosis and instructed to come back with any concerns or worsening of condition.  she and was agreeable to the plan as discussed above.  she was given the opportunity to ask questions.  All of the patient's questions were answered.    Procedures   Procedures        Rom Alvarenga MD  Emergency Medicine                                                       Rom Alvarenga MD  06/14/25 2903

## 2025-06-14 NOTE — DISCHARGE INSTRUCTIONS
For pain control, you can take ibuprofen 400 mg up to every 6 hours.  You should take the prescribed lidocaine patch.  Return to the emergency department any worsening of your pain.  Avoid lifting any heavy objects at work until your pain has been resolved.  Follow-up with your primary care doctor.

## 2025-06-14 NOTE — Clinical Note
Paola Black was seen and treated in our emergency department on 6/14/2025.  She may return to work on 06/18/2025.       If you have any questions or concerns, please don't hesitate to call.      Rom Alvarenga MD

## 2025-06-15 LAB — BACTERIA UR CULT: NORMAL

## 2025-06-16 ENCOUNTER — TELEPHONE (OUTPATIENT)
Dept: PHARMACY | Facility: HOSPITAL | Age: 30
End: 2025-06-16
Payer: COMMERCIAL

## 2025-06-16 NOTE — TELEPHONE ENCOUNTER
ED Urine Culture Negative Call-Back    Patient Name: Paola Black  Patient MRN: [id number]    Date of initial ED visit: 6/14/2025  Date of Call: 06/16/25    Patient was called today to inform them of their negative urine culture result at their recent ED visit.     Antibiotics  - Antibiotic prescribed: Keflex 500mg   - Dose & frequency prescribed: BID x5  - Duration of antibiotic: 5 days  - Prescription filled? : no  - Pills remaining: 10    Symptoms / Plan  - Symptomatic - Patient was told to discontinue antibiotics and repeat urine culture in 1 week, Counseled patient to follow up with PCP or urologist  - Current Symptoms: Frequency  - Repeat urine culture ordered?  No - patient already follows with urology. Patient aware to follow up with PCP or urologist for repeat culture     Has the patient had >1 visit for urinary complaints OR >1 urine culture ordered or urinary symptoms in the past 12 months?  - No      Mere Duong, PharmD

## 2025-07-02 ENCOUNTER — APPOINTMENT (OUTPATIENT)
Dept: SURGERY | Facility: CLINIC | Age: 30
End: 2025-07-02
Payer: COMMERCIAL

## 2025-07-02 ENCOUNTER — APPOINTMENT (OUTPATIENT)
Dept: PRIMARY CARE | Facility: CLINIC | Age: 30
End: 2025-07-02
Payer: COMMERCIAL

## 2025-08-07 ENCOUNTER — APPOINTMENT (OUTPATIENT)
Dept: PULMONOLOGY | Facility: HOSPITAL | Age: 30
End: 2025-08-07
Payer: COMMERCIAL

## 2025-08-12 ENCOUNTER — OFFICE VISIT (OUTPATIENT)
Dept: NEPHROLOGY | Facility: CLINIC | Age: 30
End: 2025-08-12
Payer: COMMERCIAL

## 2025-08-12 VITALS
HEIGHT: 66 IN | BODY MASS INDEX: 42.75 KG/M2 | WEIGHT: 266 LBS | HEART RATE: 68 BPM | TEMPERATURE: 97.9 F | OXYGEN SATURATION: 96 % | DIASTOLIC BLOOD PRESSURE: 83 MMHG | SYSTOLIC BLOOD PRESSURE: 126 MMHG

## 2025-08-12 DIAGNOSIS — Q61.2 POLYCYSTIC KIDNEY, ADULT TYPE: ICD-10-CM

## 2025-08-12 DIAGNOSIS — R35.89 POLYURIA: Primary | ICD-10-CM

## 2025-08-12 LAB
POC APPEARANCE, URINE: ABNORMAL
POC BILIRUBIN, URINE: NEGATIVE
POC BLOOD, URINE: ABNORMAL
POC COLOR, URINE: YELLOW
POC GLUCOSE, URINE: NEGATIVE MG/DL
POC KETONES, URINE: NEGATIVE MG/DL
POC LEUKOCYTES, URINE: NEGATIVE
POC NITRITE,URINE: NEGATIVE
POC PH, URINE: 7 PH
POC PROTEIN, URINE: NEGATIVE MG/DL
POC SPECIFIC GRAVITY, URINE: 1.02
POC UROBILINOGEN, URINE: 1 EU/DL

## 2025-08-12 PROCEDURE — 99204 OFFICE O/P NEW MOD 45 MIN: CPT | Performed by: INTERNAL MEDICINE

## 2025-08-12 PROCEDURE — 3008F BODY MASS INDEX DOCD: CPT | Performed by: INTERNAL MEDICINE

## 2025-08-12 PROCEDURE — 81003 URINALYSIS AUTO W/O SCOPE: CPT | Performed by: INTERNAL MEDICINE

## 2025-08-13 DIAGNOSIS — Q61.2 POLYCYSTIC KIDNEY, ADULT TYPE: ICD-10-CM

## 2025-08-19 ENCOUNTER — APPOINTMENT (OUTPATIENT)
Dept: OPHTHALMOLOGY | Facility: CLINIC | Age: 30
End: 2025-08-19
Payer: COMMERCIAL

## 2025-08-30 DIAGNOSIS — K21.9 GASTROESOPHAGEAL REFLUX DISEASE WITHOUT ESOPHAGITIS: ICD-10-CM

## 2025-09-02 RX ORDER — OMEPRAZOLE 20 MG/1
20 CAPSULE, DELAYED RELEASE ORAL DAILY
Qty: 90 CAPSULE | Refills: 0 | Status: SHIPPED | OUTPATIENT
Start: 2025-09-02

## 2025-09-04 LAB
Lab: NEGATIVE
Lab: NORMAL

## 2025-09-05 ENCOUNTER — RESULTS FOLLOW-UP (OUTPATIENT)
Dept: NEPHROLOGY | Facility: CLINIC | Age: 30
End: 2025-09-05
Payer: COMMERCIAL

## (undated) DEVICE — CONTAINER, SPECIMEN, 120 ML, STERILE

## (undated) DEVICE — DRAPE, TOWEL, STERI DRAPE, 17 X 11 IN, PLASTIC, STERILE

## (undated) DEVICE — DRESSING, TRANSPARENT, TEGADERM, 2-3/8 X 2-3/4 IN

## (undated) DEVICE — Device

## (undated) DEVICE — KIT, GUIDEWIRE BUCKET, W/LID, F/FETAL REMAINS

## (undated) DEVICE — DRAPE, SHEET, THREE QUARTER, FAN FOLD, 57 X 77 IN

## (undated) DEVICE — GOLF PENCIL, LF, STERILE

## (undated) DEVICE — TOWEL, SURGICAL, NEURO, O/R, 16 X 26, BLUE, STERILE

## (undated) DEVICE — SUTURE, VICRYL, 3-0,18 IN, SH, UNDYED

## (undated) DEVICE — VSP ORTHOGNATHIC BUNDLE

## (undated) DEVICE — CASSETTE, SONOPET IQ IRRIGATION SUCTION

## (undated) DEVICE — SUTURE, VICRYL, 3-0, 27 IN, SH, VIOLET

## (undated) DEVICE — SUPPORT, FACIOPLASTY, CROWN/CHIIN, LARGE, OVER 27 IN, ELASTIC

## (undated) DEVICE — DRESSING, GAUZE, SPONGE, KERLIX, SUPER, 6 X 6.75 IN, STERILE 10PK

## (undated) DEVICE — BAG, PRESSURE INFUSER, 3000 CC, LF

## (undated) DEVICE — GOWN, SURGICAL, SMARTGOWN, LARGE, STERILE

## (undated) DEVICE — FLOSEAL, MATRIX, HEMOSTATIC, FULL STERILE PREP, 5ML

## (undated) DEVICE — PREP TRAY, VAGINAL

## (undated) DEVICE — DRESSING, GAUZE, WASHED FLUFF, LARGE, STERILE

## (undated) DEVICE — GLOVE, SURGEON, PREMIERPRO PI, UNDERGLV, SZ-7.0, PF, GRN

## (undated) DEVICE — DRILL, STRYKER 1.6 TWIST 5MM

## (undated) DEVICE — GOWN, ASTOUND, XL

## (undated) DEVICE — STRIP, SKIN CLOSURE, STERI STRIP, REINFORCED, 0.5 X 4 IN

## (undated) DEVICE — COUNTER, NEEDLE, FOAM BLOCK, POP-N-COUNT, W/BLADEGUARD, W/ADHESIVE 40 COUNT, RED

## (undated) DEVICE — PAD, GROUNDING, ELECTROSURGICAL, W/9 FT CABLE, POLYHESIVE II, ADULT, LF

## (undated) DEVICE — DRAPE, INSTRUMENT, W/POUCH, STERI DRAPE, 7 X 11 IN, DISPOSABLE, STERILE

## (undated) DEVICE — WAX, BONE, 2.5 GM

## (undated) DEVICE — DRESSING, GAUZE, PETROLATUM, PATCH, XEROFORM, 1 X 8 IN, STERILE

## (undated) DEVICE — DRIVER, HI-TORQUE, Z-DRIVER

## (undated) DEVICE — TRAY, MINOR, SINGLE BASIN, STERILE

## (undated) DEVICE — SEALER, BIPOLAR, AQUA MANTYS MIS FLEX MINI

## (undated) DEVICE — DRAPE, PAD, INSTRUMENT, MAGNETIC, MEDIUM, 10 X 16 IN, DISPOSABLE

## (undated) DEVICE — DRAIN, PENROSE, XRAY, 1/4 X 18 IN, LF

## (undated) DEVICE — DRESSING, NON-ADHERENT, TELFA, OUCHLESS, 3 X 8 IN, STERILE

## (undated) DEVICE — BLADE, OPHTHALMIC, BEAVER, MINI, SHARP ONE SIDE

## (undated) DEVICE — SUTURE, PLAIN, 5-0, 18 IN, PC1, YELLOW

## (undated) DEVICE — STAPLER, SKIN PROXIMATE, 35 WIDE

## (undated) DEVICE — TUBING, SUCTION, NON-CONDUCTIVE, W/CONNECT,.25 IN X 12 FT, STERILE, LF

## (undated) DEVICE — DRESSING, SPONGE, GAUZE, CURITY, 4 X 4 IN, STERILE

## (undated) DEVICE — DRAPE, SHEET, UTILITY, NON ABSORBENT, 18 X 26 IN, LF

## (undated) DEVICE — STIMULATOR, NERVE LOCATOR, HEAD&NECK

## (undated) DEVICE — DRESSING, TRANSPARENT, TEGADERM, 4 X 4-3/4 IN

## (undated) DEVICE — WOUND SYSTEM, DEBRIDEMENT & CLEANING, O.R DUOPAK

## (undated) DEVICE — SUTURE, SILK, 2-0, TIES, 12-30 IN, BLACK

## (undated) DEVICE — SUTURE, SILK, 2-0, 30 IN, SH, BLACK

## (undated) DEVICE — CORD, CAUTERY, BIOPOLAR FORCEP, 12FT

## (undated) DEVICE — SPONGE, GAUZE, XRAY DECT, 16 PLY, 4 X 4, W/MASTER DMT,STERILE

## (undated) DEVICE — DRESSING, GAUZE, DRAIN SPONGE, 6 PLY, EXCILON, 4 X 4 IN, STERILE

## (undated) DEVICE — KIT, MINOR, DOUBLE BASIN

## (undated) DEVICE — SUTURE, CHROMIC, 3-0, 27 IN, PS2, BROWN

## (undated) DEVICE — TRAY, DRY PREP, PREMIUM

## (undated) DEVICE — GLOVE, SURGICAL, PROTEXIS PI BLUE W/NEUTHERA, 7.0, PF, LF

## (undated) DEVICE — KNIFE, IQ APEX, 11CM

## (undated) DEVICE — SUCTION, VERSALIGHT MINI W/EXTENDED FRAZIER TIP

## (undated) DEVICE — SUTURE, VICRYL, 4-0, 18 IN, PS2, UNDYED